# Patient Record
Sex: FEMALE | Race: WHITE | NOT HISPANIC OR LATINO | Employment: OTHER | URBAN - METROPOLITAN AREA
[De-identification: names, ages, dates, MRNs, and addresses within clinical notes are randomized per-mention and may not be internally consistent; named-entity substitution may affect disease eponyms.]

---

## 2017-09-22 ENCOUNTER — HISTORICAL (OUTPATIENT)
Dept: RADIOLOGY | Facility: HOSPITAL | Age: 62
End: 2017-09-22

## 2018-03-12 ENCOUNTER — HISTORICAL (OUTPATIENT)
Dept: ADMINISTRATIVE | Facility: HOSPITAL | Age: 63
End: 2018-03-12

## 2018-03-12 LAB
ALBUMIN SERPL-MCNC: 3.7 GM/DL (ref 3.4–5)
ALBUMIN/GLOB SERPL: 1.2 RATIO (ref 1.1–2)
ALP SERPL-CCNC: 96 UNIT/L (ref 38–126)
ALT SERPL-CCNC: 34 UNIT/L (ref 12–78)
AST SERPL-CCNC: 19 UNIT/L (ref 15–37)
BILIRUB SERPL-MCNC: 0.6 MG/DL (ref 0.2–1)
BILIRUBIN DIRECT+TOT PNL SERPL-MCNC: 0.2 MG/DL (ref 0–0.5)
BILIRUBIN DIRECT+TOT PNL SERPL-MCNC: 0.4 MG/DL (ref 0–0.8)
BUN SERPL-MCNC: 11 MG/DL (ref 7–18)
CALCIUM SERPL-MCNC: 9.2 MG/DL (ref 8.5–10.1)
CHLORIDE SERPL-SCNC: 105 MMOL/L (ref 98–107)
CO2 SERPL-SCNC: 28 MMOL/L (ref 21–32)
CREAT SERPL-MCNC: 0.88 MG/DL (ref 0.55–1.02)
GLOBULIN SER-MCNC: 3.1 GM/DL (ref 2.4–3.5)
GLUCOSE SERPL-MCNC: 110 MG/DL (ref 74–106)
MAGNESIUM SERPL-MCNC: 2.4 MG/DL (ref 1.8–2.4)
POTASSIUM SERPL-SCNC: 3.7 MMOL/L (ref 3.5–5.1)
PROT SERPL-MCNC: 6.8 GM/DL (ref 6.4–8.2)
SODIUM SERPL-SCNC: 140 MMOL/L (ref 136–145)

## 2018-07-03 ENCOUNTER — HISTORICAL (OUTPATIENT)
Dept: LAB | Facility: HOSPITAL | Age: 63
End: 2018-07-03

## 2018-07-03 LAB
BUN SERPL-MCNC: 9 MG/DL (ref 7–18)
CALCIUM SERPL-MCNC: 9.2 MG/DL (ref 8.5–10.1)
CHLORIDE SERPL-SCNC: 102 MMOL/L (ref 98–107)
CO2 SERPL-SCNC: 28.1 MMOL/L (ref 21–32)
CREAT SERPL-MCNC: 0.93 MG/DL (ref 0.55–1.02)
CREAT/UREA NIT SERPL: 10
GLUCOSE SERPL-MCNC: 153 MG/DL (ref 74–106)
POTASSIUM SERPL-SCNC: 3.4 MMOL/L (ref 3.5–5.1)
SODIUM SERPL-SCNC: 139 MMOL/L (ref 136–145)

## 2018-08-03 ENCOUNTER — HISTORICAL (OUTPATIENT)
Dept: LAB | Facility: HOSPITAL | Age: 63
End: 2018-08-03

## 2018-08-03 LAB — POTASSIUM SERPL-SCNC: 3.6 MMOL/L (ref 3.5–5.1)

## 2018-09-20 ENCOUNTER — HISTORICAL (OUTPATIENT)
Dept: LAB | Facility: HOSPITAL | Age: 63
End: 2018-09-20

## 2018-09-20 LAB
EST. AVERAGE GLUCOSE BLD GHB EST-MCNC: 117 MG/DL
HBA1C MFR BLD: 5.7 % (ref 4.5–6.2)

## 2018-11-30 ENCOUNTER — HISTORICAL (OUTPATIENT)
Dept: LAB | Facility: HOSPITAL | Age: 63
End: 2018-11-30

## 2018-11-30 LAB
BUN SERPL-MCNC: 18 MG/DL (ref 7–18)
CALCIUM SERPL-MCNC: 9.4 MG/DL (ref 8.5–10.1)
CHLORIDE SERPL-SCNC: 99 MMOL/L (ref 98–107)
CO2 SERPL-SCNC: 29.5 MMOL/L (ref 21–32)
CREAT SERPL-MCNC: 0.93 MG/DL (ref 0.55–1.02)
CREAT/UREA NIT SERPL: 19
GLUCOSE SERPL-MCNC: 89 MG/DL (ref 74–106)
MAGNESIUM SERPL-MCNC: 2.2 MG/DL (ref 1.8–2.4)
POTASSIUM SERPL-SCNC: 3.8 MMOL/L (ref 3.5–5.1)
SODIUM SERPL-SCNC: 136 MMOL/L (ref 136–145)

## 2019-01-11 ENCOUNTER — HISTORICAL (OUTPATIENT)
Dept: LAB | Facility: HOSPITAL | Age: 64
End: 2019-01-11

## 2019-01-11 LAB
ABS NEUT (OLG): 5.08
ALBUMIN SERPL-MCNC: 3.9 GM/DL (ref 3.4–5)
ALBUMIN/GLOB SERPL: 1.1 RATIO (ref 1.1–2)
ALP SERPL-CCNC: 78 UNIT/L (ref 46–116)
ALT SERPL-CCNC: 33 UNIT/L (ref 12–78)
AST SERPL-CCNC: 17 UNIT/L (ref 10–37)
BASOPHILS # BLD AUTO: 0.02 X10(3)/MCL
BASOPHILS NFR BLD AUTO: 0.3 %
BILIRUB SERPL-MCNC: 0.5 MG/DL (ref 0.2–1)
BILIRUBIN DIRECT+TOT PNL SERPL-MCNC: 0.15 MG/DL (ref 0–0.2)
BILIRUBIN DIRECT+TOT PNL SERPL-MCNC: 0.35 MG/DL
BUN SERPL-MCNC: 17 MG/DL (ref 7–18)
CALCIUM SERPL-MCNC: 9.8 MG/DL (ref 8.5–10.1)
CHLORIDE SERPL-SCNC: 102 MMOL/L (ref 98–107)
CHOLEST SERPL-MCNC: 183 MG/DL (ref 50–200)
CHOLEST/HDLC SERPL: 3 {RATIO} (ref 0–5)
CO2 SERPL-SCNC: 31.6 MMOL/L (ref 21–32)
CREAT SERPL-MCNC: 1.1 MG/DL (ref 0.55–1.02)
DIGOXIN SERPL-MCNC: 0.28 NG/ML (ref 0.9–2)
EOSINOPHIL # BLD AUTO: 0.11 X10(3)/MCL
EOSINOPHIL NFR BLD AUTO: 1.4 %
ERYTHROCYTE [DISTWIDTH] IN BLOOD BY AUTOMATED COUNT: 12 %
GLOBULIN SER-MCNC: 3.4 GM/DL (ref 2.4–3.5)
GLUCOSE SERPL-MCNC: 115 MG/DL (ref 74–106)
HCT VFR BLD AUTO: 46.5 % (ref 34–46)
HDLC SERPL-MCNC: 57 MG/DL (ref 35–60)
HGB BLD-MCNC: 15.7 GM/DL (ref 11.3–15.4)
IMM GRANULOCYTES # BLD AUTO: 0.01 10*3/UL (ref 0–0.1)
IMM GRANULOCYTES NFR BLD AUTO: 0.1 % (ref 0–1)
LDLC SERPL CALC-MCNC: 110 MG/DL (ref 50–140)
LYMPHOCYTES # BLD AUTO: 1.76 X10(3)/MCL
LYMPHOCYTES NFR BLD AUTO: 23.1 %
MCH RBC QN AUTO: 31.2 PG (ref 27–33)
MCHC RBC AUTO-ENTMCNC: 33.8 GM/DL (ref 32–35)
MCV RBC AUTO: 92.4 FL (ref 81–97)
MONOCYTES # BLD AUTO: 0.64 X10(3)/MCL
MONOCYTES NFR BLD AUTO: 8.4 %
NEUTROPHILS # BLD AUTO: 5.08 X10(3)/MCL
NEUTROPHILS NFR BLD AUTO: 66.7 %
PLATELET # BLD AUTO: 284 X10(3)/MCL (ref 151–368)
PMV BLD AUTO: 9 FL
POTASSIUM SERPL-SCNC: 4.5 MMOL/L (ref 3.5–5.1)
PROT SERPL-MCNC: 7.3 GM/DL (ref 6.4–8.2)
RBC # BLD AUTO: 5.03 X10(6)/MCL (ref 3.9–5)
SODIUM SERPL-SCNC: 140 MMOL/L (ref 136–145)
T4 SERPL-MCNC: 5.2 MCG/DL (ref 4.7–13.3)
TRIGL SERPL-MCNC: 78 MG/DL (ref 30–150)
TSH SERPL-ACNC: 0.82 MIU/ML (ref 0.35–3.75)
VLDLC SERPL CALC-MCNC: 16 MG/DL
WBC # SPEC AUTO: 7.62 X10(3)/MCL (ref 3.4–9.2)

## 2019-07-22 ENCOUNTER — HISTORICAL (OUTPATIENT)
Dept: LAB | Facility: HOSPITAL | Age: 64
End: 2019-07-22

## 2019-07-22 LAB
ALBUMIN SERPL-MCNC: 3.5 GM/DL (ref 3.4–5)
ALBUMIN/GLOB SERPL: 1 RATIO (ref 1.1–2)
ALP SERPL-CCNC: 74 UNIT/L (ref 46–116)
ALT SERPL-CCNC: 33 UNIT/L (ref 12–78)
AST SERPL-CCNC: 17 UNIT/L (ref 10–37)
BILIRUB SERPL-MCNC: 0.4 MG/DL (ref 0.2–1)
BILIRUBIN DIRECT+TOT PNL SERPL-MCNC: 0.12 MG/DL (ref 0–0.2)
BILIRUBIN DIRECT+TOT PNL SERPL-MCNC: 0.28 MG/DL
BUN SERPL-MCNC: 15 MG/DL (ref 7–18)
CALCIUM SERPL-MCNC: 9.9 MG/DL (ref 8.5–10.1)
CHLORIDE SERPL-SCNC: 103 MMOL/L (ref 98–107)
CHOLEST SERPL-MCNC: 182 MG/DL (ref 50–200)
CHOLEST/HDLC SERPL: 3 {RATIO} (ref 0–5)
CO2 SERPL-SCNC: 31.2 MMOL/L (ref 21–32)
CREAT SERPL-MCNC: 0.98 MG/DL (ref 0.55–1.02)
GLOBULIN SER-MCNC: 3.5 GM/DL (ref 2.4–3.5)
GLUCOSE SERPL-MCNC: 108 MG/DL (ref 74–106)
HDLC SERPL-MCNC: 60 MG/DL (ref 35–60)
LDLC SERPL CALC-MCNC: 107 MG/DL (ref 50–140)
POTASSIUM SERPL-SCNC: 4.1 MMOL/L (ref 3.5–5.1)
PROT SERPL-MCNC: 7 GM/DL (ref 6.4–8.2)
SODIUM SERPL-SCNC: 139 MMOL/L (ref 136–145)
TRIGL SERPL-MCNC: 73 MG/DL (ref 30–150)
VLDLC SERPL CALC-MCNC: 15 MG/DL

## 2020-01-27 ENCOUNTER — HISTORICAL (OUTPATIENT)
Dept: RADIOLOGY | Facility: HOSPITAL | Age: 65
End: 2020-01-27

## 2020-05-21 ENCOUNTER — HISTORICAL (OUTPATIENT)
Dept: LAB | Facility: HOSPITAL | Age: 65
End: 2020-05-21

## 2020-05-21 LAB
ABS NEUT (OLG): 5.28
BASOPHILS # BLD AUTO: 0.03 X10(3)/MCL
BASOPHILS NFR BLD AUTO: 0.4 %
CRP SERPL-MCNC: 0.4 MG/DL
EOSINOPHIL # BLD AUTO: 0.06 X10(3)/MCL
EOSINOPHIL NFR BLD AUTO: 0.8 %
ERYTHROCYTE [DISTWIDTH] IN BLOOD BY AUTOMATED COUNT: 13 %
ERYTHROCYTE [SEDIMENTATION RATE] IN BLOOD: 14 MM/HR (ref 0–20)
HCT VFR BLD AUTO: 45.3 % (ref 34–46)
HGB BLD-MCNC: 15 GM/DL (ref 11.3–15.4)
IMM GRANULOCYTES # BLD AUTO: 0.02 10*3/UL (ref 0–0.1)
IMM GRANULOCYTES NFR BLD AUTO: 0.3 % (ref 0–1)
LYMPHOCYTES # BLD AUTO: 1.32 X10(3)/MCL
LYMPHOCYTES NFR BLD AUTO: 18.4 %
MCH RBC QN AUTO: 31.4 PG (ref 27–33)
MCHC RBC AUTO-ENTMCNC: 33.1 GM/DL (ref 32–35)
MCV RBC AUTO: 94.8 FL (ref 81–97)
MONOCYTES # BLD AUTO: 0.47 X10(3)/MCL
MONOCYTES NFR BLD AUTO: 6.5 %
NEUTROPHILS # BLD AUTO: 5.28 X10(3)/MCL
NEUTROPHILS NFR BLD AUTO: 73.6 %
PLATELET # BLD AUTO: 310 X10(3)/MCL (ref 140–450)
PMV BLD AUTO: 9 FL
RBC # BLD AUTO: 4.78 X10(6)/MCL (ref 3.9–5)
URATE SERPL-MCNC: 5.9 MG/DL (ref 2.7–6)
WBC # SPEC AUTO: 7.18 X10(3)/MCL (ref 3.4–9.2)

## 2020-06-29 ENCOUNTER — HISTORICAL (OUTPATIENT)
Dept: LAB | Facility: HOSPITAL | Age: 65
End: 2020-06-29

## 2020-07-20 ENCOUNTER — HISTORICAL (OUTPATIENT)
Dept: LAB | Facility: HOSPITAL | Age: 65
End: 2020-07-20

## 2020-07-20 LAB
ALBUMIN SERPL-MCNC: 3.9 GM/DL (ref 3.4–4.8)
ALBUMIN/GLOB SERPL: 1.3 RATIO (ref 1.1–2)
ALP SERPL-CCNC: 66 UNIT/L (ref 40–150)
ALT SERPL-CCNC: 22 UNIT/L (ref 0–55)
AST SERPL-CCNC: 19 UNIT/L (ref 5–34)
BILIRUB SERPL-MCNC: 0.5 MG/DL
BILIRUBIN DIRECT+TOT PNL SERPL-MCNC: 0.2 MG/DL (ref 0–0.5)
BILIRUBIN DIRECT+TOT PNL SERPL-MCNC: 0.3 MG/DL
BUN SERPL-MCNC: 16 MG/DL (ref 9.8–20.1)
CALCIUM SERPL-MCNC: 10.1 MG/DL (ref 8.4–10.2)
CHLORIDE SERPL-SCNC: 105 MMOL/L (ref 98–107)
CHOLEST SERPL-MCNC: 168 MG/DL
CHOLEST/HDLC SERPL: 3 {RATIO} (ref 0–5)
CO2 SERPL-SCNC: 26 MEQ/L (ref 23–31)
CREAT SERPL-MCNC: 0.79 MG/DL (ref 0.55–1.02)
DIGOXIN SERPL-MCNC: <0.19 NG/ML (ref 0.8–2)
GLOBULIN SER-MCNC: 3 GM/DL (ref 2.4–3.5)
GLUCOSE SERPL-MCNC: 114 MG/DL (ref 82–115)
HDLC SERPL-MCNC: 57 MG/DL (ref 35–60)
LDLC SERPL CALC-MCNC: 97 MG/DL (ref 50–140)
POTASSIUM SERPL-SCNC: 3.9 MMOL/L (ref 3.5–5.1)
PROT SERPL-MCNC: 6.9 GM/DL (ref 5.8–7.6)
SODIUM SERPL-SCNC: 141 MMOL/L (ref 136–145)
TRIGL SERPL-MCNC: 72 MG/DL (ref 37–140)
VLDLC SERPL CALC-MCNC: 14 MG/DL

## 2021-09-08 ENCOUNTER — HISTORICAL (OUTPATIENT)
Dept: ADMINISTRATIVE | Facility: HOSPITAL | Age: 66
End: 2021-09-08

## 2021-09-08 LAB
APPEARANCE, UA: CLEAR
BACTERIA SPEC CULT: NORMAL /HPF
BILIRUB UR QL STRIP: NEGATIVE
COLOR UR: YELLOW
GLUCOSE (UA): NEGATIVE
HGB UR QL STRIP: NEGATIVE
KETONES UR QL STRIP: NEGATIVE
LEUKOCYTE ESTERASE UR QL STRIP: NEGATIVE
NITRITE UR QL STRIP: NEGATIVE
PH UR STRIP: 6.5 [PH] (ref 5–9)
PROT UR QL STRIP: NEGATIVE
RBC #/AREA URNS HPF: NORMAL /[HPF]
SP GR UR STRIP: 1.01 (ref 1–1.03)
SQUAMOUS EPITHELIAL, UA: NORMAL /HPF (ref 0–4)
UROBILINOGEN UR STRIP-ACNC: 0.2
WBC #/AREA URNS HPF: NORMAL /[HPF]

## 2021-09-28 LAB — CRC RECOMMENDATION EXT: NORMAL

## 2022-05-05 ENCOUNTER — LAB VISIT (OUTPATIENT)
Dept: LAB | Facility: HOSPITAL | Age: 67
End: 2022-05-05
Attending: INTERNAL MEDICINE
Payer: MEDICARE

## 2022-05-05 DIAGNOSIS — I48.0 PAROXYSMAL ATRIAL FIBRILLATION: ICD-10-CM

## 2022-05-05 DIAGNOSIS — E78.5 HYPERLIPIDEMIA, UNSPECIFIED HYPERLIPIDEMIA TYPE: ICD-10-CM

## 2022-05-05 DIAGNOSIS — I10 ESSENTIAL HYPERTENSION, MALIGNANT: Primary | ICD-10-CM

## 2022-05-05 LAB
ALBUMIN SERPL-MCNC: 4.1 GM/DL (ref 3.4–4.8)
ALBUMIN/GLOB SERPL: 1.6 RATIO (ref 1.1–2)
ALP SERPL-CCNC: 83 UNIT/L (ref 40–150)
ALT SERPL-CCNC: 30 UNIT/L (ref 0–55)
AST SERPL-CCNC: 23 UNIT/L (ref 5–34)
BILIRUBIN DIRECT+TOT PNL SERPL-MCNC: 0.2 MG/DL (ref 0–0.5)
BILIRUBIN DIRECT+TOT PNL SERPL-MCNC: 0.2 MG/DL (ref 0–0.8)
BILIRUBIN DIRECT+TOT PNL SERPL-MCNC: 0.4 MG/DL
BUN SERPL-MCNC: 20.8 MG/DL (ref 9.8–20.1)
CALCIUM SERPL-MCNC: 9.7 MG/DL (ref 8.4–10.2)
CHLORIDE SERPL-SCNC: 107 MMOL/L (ref 98–107)
CHOLEST SERPL-MCNC: 186 MG/DL
CHOLEST/HDLC SERPL: 3 {RATIO} (ref 0–5)
CO2 SERPL-SCNC: 23 MMOL/L (ref 23–31)
CREAT SERPL-MCNC: 1.63 MG/DL (ref 0.55–1.02)
DIGOXIN SERPL-MCNC: 0.6 NG/ML (ref 0.8–2)
ERYTHROCYTE [DISTWIDTH] IN BLOOD BY AUTOMATED COUNT: 16 % (ref 11.5–17)
GLOBULIN SER-MCNC: 2.6 GM/DL (ref 2.4–3.5)
GLUCOSE SERPL-MCNC: 96 MG/DL (ref 82–115)
HCT VFR BLD AUTO: 35 % (ref 37–47)
HDLC SERPL-MCNC: 57 MG/DL (ref 35–60)
HGB BLD-MCNC: 11.1 GM/DL (ref 12–16)
LDLC SERPL CALC-MCNC: 105 MG/DL (ref 50–140)
MCH RBC QN AUTO: 30.4 PG (ref 27–31)
MCHC RBC AUTO-ENTMCNC: 31.7 MG/DL (ref 33–36)
MCV RBC AUTO: 95.9 FL (ref 80–94)
NRBC BLD AUTO-RTO: 0 %
PLATELET # BLD AUTO: 326 X10(3)/MCL (ref 130–400)
PMV BLD AUTO: 9.6 FL (ref 9.4–12.4)
POTASSIUM SERPL-SCNC: 3.5 MMOL/L (ref 3.5–5.1)
PROT SERPL-MCNC: 6.7 GM/DL (ref 5.8–7.6)
RBC # BLD AUTO: 3.65 X10(6)/MCL (ref 4.2–5.4)
SODIUM SERPL-SCNC: 142 MMOL/L (ref 136–145)
T4 SERPL-MCNC: 5.65 UG/DL (ref 4.87–11.72)
TRIGL SERPL-MCNC: 122 MG/DL (ref 37–140)
TSH SERPL-ACNC: 2.57 UIU/ML (ref 0.35–4.94)
VLDLC SERPL CALC-MCNC: 24 MG/DL
WBC # SPEC AUTO: 7 X10(3)/MCL (ref 4.5–11.5)

## 2022-05-05 PROCEDURE — 84443 ASSAY THYROID STIM HORMONE: CPT

## 2022-05-05 PROCEDURE — 80061 LIPID PANEL: CPT

## 2022-05-05 PROCEDURE — 85027 COMPLETE CBC AUTOMATED: CPT

## 2022-05-05 PROCEDURE — 36415 COLL VENOUS BLD VENIPUNCTURE: CPT

## 2022-05-05 PROCEDURE — 80053 COMPREHEN METABOLIC PANEL: CPT

## 2022-05-05 PROCEDURE — 80162 ASSAY OF DIGOXIN TOTAL: CPT

## 2022-05-05 PROCEDURE — 84436 ASSAY OF TOTAL THYROXINE: CPT

## 2022-06-03 ENCOUNTER — LAB VISIT (OUTPATIENT)
Dept: LAB | Facility: HOSPITAL | Age: 67
End: 2022-06-03
Attending: INTERNAL MEDICINE
Payer: MEDICARE

## 2022-06-03 DIAGNOSIS — E21.3 HYPERPARATHYROIDISM, UNSPECIFIED: ICD-10-CM

## 2022-06-03 DIAGNOSIS — I50.9 HEART FAILURE, UNSPECIFIED HF CHRONICITY, UNSPECIFIED HEART FAILURE TYPE: ICD-10-CM

## 2022-06-03 DIAGNOSIS — N18.9 ANEMIA OF CHRONIC RENAL FAILURE, UNSPECIFIED CKD STAGE: ICD-10-CM

## 2022-06-03 DIAGNOSIS — N17.9 ACUTE KIDNEY FAILURE, UNSPECIFIED: Primary | ICD-10-CM

## 2022-06-03 DIAGNOSIS — N18.9 CHRONIC KIDNEY DISEASE, UNSPECIFIED: ICD-10-CM

## 2022-06-03 DIAGNOSIS — I12.9 PARENCHYMAL RENAL HYPERTENSION: ICD-10-CM

## 2022-06-03 DIAGNOSIS — E87.8 DILATED CARDIOMYOPATHY SECONDARY TO ELECTROLYTE DEFICIENCY: ICD-10-CM

## 2022-06-03 DIAGNOSIS — I43 DILATED CARDIOMYOPATHY SECONDARY TO ELECTROLYTE DEFICIENCY: ICD-10-CM

## 2022-06-03 DIAGNOSIS — E55.9 AVITAMINOSIS D: ICD-10-CM

## 2022-06-03 DIAGNOSIS — D63.1 ANEMIA OF CHRONIC RENAL FAILURE, UNSPECIFIED CKD STAGE: ICD-10-CM

## 2022-06-03 LAB
ALBUMIN SERPL-MCNC: 3.9 GM/DL (ref 3.4–4.8)
ALBUMIN/GLOB SERPL: 1.5 RATIO (ref 1.1–2)
ALP SERPL-CCNC: 89 UNIT/L (ref 40–150)
ALT SERPL-CCNC: 16 UNIT/L (ref 0–55)
APPEARANCE UR: CLEAR
AST SERPL-CCNC: 15 UNIT/L (ref 5–34)
BACTERIA #/AREA URNS AUTO: ABNORMAL /HPF
BILIRUB UR QL STRIP.AUTO: NEGATIVE MG/DL
BILIRUBIN DIRECT+TOT PNL SERPL-MCNC: 0.3 MG/DL
BUN SERPL-MCNC: 16.3 MG/DL (ref 9.8–20.1)
CALCIUM SERPL-MCNC: 10 MG/DL (ref 8.4–10.2)
CHLORIDE SERPL-SCNC: 103 MMOL/L (ref 98–107)
CO2 SERPL-SCNC: 26 MMOL/L (ref 23–31)
COLOR UR AUTO: YELLOW
CREAT SERPL-MCNC: 1.71 MG/DL (ref 0.55–1.02)
CREAT UR-MCNC: 232.6 MG/DL (ref 47–110)
DEPRECATED CALCIDIOL+CALCIFEROL SERPL-MC: 44.1 NG/ML (ref 30–80)
ERYTHROCYTE [DISTWIDTH] IN BLOOD BY AUTOMATED COUNT: 14 % (ref 11.5–17)
FERRITIN SERPL-MCNC: 484.17 NG/ML (ref 4.63–204)
GLOBULIN SER-MCNC: 2.6 GM/DL (ref 2.4–3.5)
GLUCOSE SERPL-MCNC: 129 MG/DL (ref 82–115)
GLUCOSE UR QL STRIP.AUTO: NEGATIVE MG/DL
HCT VFR BLD AUTO: 35.1 % (ref 37–47)
HGB BLD-MCNC: 11.4 GM/DL (ref 12–16)
IRON SATN MFR SERPL: 40 % (ref 20–50)
IRON SERPL-MCNC: 99 UG/DL (ref 50–170)
KETONES UR QL STRIP.AUTO: ABNORMAL MG/DL
LEUKOCYTE ESTERASE UR QL STRIP.AUTO: ABNORMAL UNIT/L
MCH RBC QN AUTO: 30.6 PG (ref 27–31)
MCHC RBC AUTO-ENTMCNC: 32.5 MG/DL (ref 33–36)
MCV RBC AUTO: 94.4 FL (ref 80–94)
NITRITE UR QL STRIP.AUTO: NEGATIVE
NRBC BLD AUTO-RTO: 0 %
PH UR STRIP.AUTO: 5.5 [PH]
PHOSPHATE SERPL-MCNC: 3.1 MG/DL (ref 2.3–4.7)
PLATELET # BLD AUTO: 326 X10(3)/MCL (ref 130–400)
PMV BLD AUTO: 9.6 FL (ref 9.4–12.4)
POTASSIUM SERPL-SCNC: 4 MMOL/L (ref 3.5–5.1)
PROT SERPL-MCNC: 6.5 GM/DL (ref 5.8–7.6)
PROT UR QL STRIP.AUTO: ABNORMAL MG/DL
PROT UR STRIP-MCNC: 12.4 MG/DL
PROT/CREAT UR-RTO: 53.3 MG/GM CR
PTH-INTACT SERPL-MCNC: 61.3 PG/ML (ref 8.7–77)
RBC # BLD AUTO: 3.72 X10(6)/MCL (ref 4.2–5.4)
RBC #/AREA URNS AUTO: <5 /HPF
RBC UR QL AUTO: NEGATIVE UNIT/L
SODIUM SERPL-SCNC: 140 MMOL/L (ref 136–145)
SP GR UR STRIP.AUTO: 1.02 (ref 1–1.03)
SQUAMOUS #/AREA URNS AUTO: 10 /LPF
TIBC SERPL-MCNC: 148 UG/DL (ref 70–310)
TIBC SERPL-MCNC: 247 UG/DL (ref 250–450)
TRANSFERRIN SERPL-MCNC: 211 MG/DL (ref 173–360)
URATE SERPL-MCNC: 8.5 MG/DL (ref 2.6–6)
UROBILINOGEN UR STRIP-ACNC: 0.2 MG/DL
WBC # SPEC AUTO: 7.6 X10(3)/MCL (ref 4.5–11.5)
WBC #/AREA URNS AUTO: <5 /HPF

## 2022-06-03 PROCEDURE — 84100 ASSAY OF PHOSPHORUS: CPT

## 2022-06-03 PROCEDURE — 82728 ASSAY OF FERRITIN: CPT

## 2022-06-03 PROCEDURE — 84550 ASSAY OF BLOOD/URIC ACID: CPT

## 2022-06-03 PROCEDURE — 83970 ASSAY OF PARATHORMONE: CPT

## 2022-06-03 PROCEDURE — 82306 VITAMIN D 25 HYDROXY: CPT

## 2022-06-03 PROCEDURE — 80053 COMPREHEN METABOLIC PANEL: CPT

## 2022-06-03 PROCEDURE — 82570 ASSAY OF URINE CREATININE: CPT

## 2022-06-03 PROCEDURE — 36415 COLL VENOUS BLD VENIPUNCTURE: CPT

## 2022-06-03 PROCEDURE — 81001 URINALYSIS AUTO W/SCOPE: CPT

## 2022-06-03 PROCEDURE — 85027 COMPLETE CBC AUTOMATED: CPT

## 2022-06-03 PROCEDURE — 83540 ASSAY OF IRON: CPT

## 2022-06-23 ENCOUNTER — HOSPITAL ENCOUNTER (OUTPATIENT)
Dept: RADIOLOGY | Facility: HOSPITAL | Age: 67
Discharge: HOME | End: 2022-06-23
Attending: FAMILY MEDICINE
Payer: MEDICARE

## 2022-06-23 DIAGNOSIS — R05.3 CHRONIC COUGH: Primary | ICD-10-CM

## 2022-06-23 DIAGNOSIS — R05.3 CHRONIC COUGH: ICD-10-CM

## 2022-06-23 PROCEDURE — 71046 X-RAY EXAM CHEST 2 VIEWS: CPT | Mod: TC

## 2022-06-29 ENCOUNTER — HOSPITAL ENCOUNTER (OUTPATIENT)
Dept: RADIOLOGY | Facility: HOSPITAL | Age: 67
Discharge: HOME | End: 2022-06-29
Attending: FAMILY MEDICINE
Payer: MEDICARE

## 2022-06-29 DIAGNOSIS — R06.00 DYSPNEA: ICD-10-CM

## 2022-06-29 PROCEDURE — 71250 CT THORAX DX C-: CPT | Mod: TC

## 2022-09-13 LAB — BCS RECOMMENDATION EXT: NORMAL

## 2022-09-14 ENCOUNTER — LAB VISIT (OUTPATIENT)
Dept: LAB | Facility: HOSPITAL | Age: 67
End: 2022-09-14
Attending: FAMILY MEDICINE
Payer: MEDICARE

## 2022-09-14 DIAGNOSIS — D64.9 ANEMIA, UNSPECIFIED TYPE: Primary | ICD-10-CM

## 2022-09-14 LAB
ALBUMIN SERPL-MCNC: 4.1 GM/DL (ref 3.4–4.8)
ALBUMIN/GLOB SERPL: 1.7 RATIO (ref 1.1–2)
ALP SERPL-CCNC: 75 UNIT/L (ref 40–150)
ALT SERPL-CCNC: 13 UNIT/L (ref 0–55)
AST SERPL-CCNC: 13 UNIT/L (ref 5–34)
BASOPHILS # BLD AUTO: 0.04 X10(3)/MCL (ref 0–0.2)
BASOPHILS NFR BLD AUTO: 0.7 %
BILIRUBIN DIRECT+TOT PNL SERPL-MCNC: 0.3 MG/DL
BUN SERPL-MCNC: 27.1 MG/DL (ref 9.8–20.1)
CALCIUM SERPL-MCNC: 9.9 MG/DL (ref 8.4–10.2)
CHLORIDE SERPL-SCNC: 104 MMOL/L (ref 98–107)
CO2 SERPL-SCNC: 28 MMOL/L (ref 23–31)
CREAT SERPL-MCNC: 1.84 MG/DL (ref 0.55–1.02)
EOSINOPHIL # BLD AUTO: 0.13 X10(3)/MCL (ref 0–0.9)
EOSINOPHIL NFR BLD AUTO: 2.1 %
ERYTHROCYTE [DISTWIDTH] IN BLOOD BY AUTOMATED COUNT: 12.2 % (ref 11.5–17)
GFR SERPLBLD CREATININE-BSD FMLA CKD-EPI: 30 MLS/MIN/1.73/M2
GLOBULIN SER-MCNC: 2.4 GM/DL (ref 2.4–3.5)
GLUCOSE SERPL-MCNC: 111 MG/DL (ref 82–115)
HCT VFR BLD AUTO: 36.3 % (ref 37–47)
HGB BLD-MCNC: 11.7 GM/DL (ref 12–16)
IMM GRANULOCYTES # BLD AUTO: 0.02 X10(3)/MCL (ref 0–0.04)
IMM GRANULOCYTES NFR BLD AUTO: 0.3 %
LYMPHOCYTES # BLD AUTO: 1.78 X10(3)/MCL (ref 0.6–4.6)
LYMPHOCYTES NFR BLD AUTO: 28.9 %
MCH RBC QN AUTO: 31.1 PG (ref 27–31)
MCHC RBC AUTO-ENTMCNC: 32.2 MG/DL (ref 33–36)
MCV RBC AUTO: 96.5 FL (ref 80–94)
MONOCYTES # BLD AUTO: 0.53 X10(3)/MCL (ref 0.1–1.3)
MONOCYTES NFR BLD AUTO: 8.6 %
NEUTROPHILS # BLD AUTO: 3.7 X10(3)/MCL (ref 2.1–9.2)
NEUTROPHILS NFR BLD AUTO: 59.4 %
NRBC BLD AUTO-RTO: 0 %
PLATELET # BLD AUTO: 315 X10(3)/MCL (ref 130–400)
PMV BLD AUTO: 9.3 FL (ref 7.4–10.4)
POTASSIUM SERPL-SCNC: 4.5 MMOL/L (ref 3.5–5.1)
PROT SERPL-MCNC: 6.5 GM/DL (ref 5.8–7.6)
RBC # BLD AUTO: 3.76 X10(6)/MCL (ref 4.2–5.4)
SODIUM SERPL-SCNC: 139 MMOL/L (ref 136–145)
WBC # SPEC AUTO: 6.2 X10(3)/MCL (ref 4.5–11.5)

## 2022-09-14 PROCEDURE — 85025 COMPLETE CBC W/AUTO DIFF WBC: CPT

## 2022-09-14 PROCEDURE — 80053 COMPREHEN METABOLIC PANEL: CPT

## 2022-09-14 PROCEDURE — 36415 COLL VENOUS BLD VENIPUNCTURE: CPT

## 2022-10-07 ENCOUNTER — LAB VISIT (OUTPATIENT)
Dept: LAB | Facility: HOSPITAL | Age: 67
End: 2022-10-07
Attending: INTERNAL MEDICINE
Payer: MEDICARE

## 2022-10-07 DIAGNOSIS — E78.5 HYPERLIPIDEMIA, UNSPECIFIED HYPERLIPIDEMIA TYPE: ICD-10-CM

## 2022-10-07 DIAGNOSIS — I10 ESSENTIAL HYPERTENSION, MALIGNANT: Primary | ICD-10-CM

## 2022-10-07 LAB
ALBUMIN SERPL-MCNC: 4 GM/DL (ref 3.4–4.8)
ALBUMIN/GLOB SERPL: 1.6 RATIO (ref 1.1–2)
ALP SERPL-CCNC: 80 UNIT/L (ref 40–150)
ALT SERPL-CCNC: 17 UNIT/L (ref 0–55)
AST SERPL-CCNC: 15 UNIT/L (ref 5–34)
BILIRUBIN DIRECT+TOT PNL SERPL-MCNC: 0.5 MG/DL
BUN SERPL-MCNC: 23 MG/DL (ref 9.8–20.1)
CALCIUM SERPL-MCNC: 10 MG/DL (ref 8.4–10.2)
CHLORIDE SERPL-SCNC: 105 MMOL/L (ref 98–107)
CHOLEST SERPL-MCNC: 199 MG/DL
CHOLEST/HDLC SERPL: 3 {RATIO} (ref 0–5)
CO2 SERPL-SCNC: 27 MMOL/L (ref 23–31)
CREAT SERPL-MCNC: 1.59 MG/DL (ref 0.55–1.02)
GFR SERPLBLD CREATININE-BSD FMLA CKD-EPI: 35 MLS/MIN/1.73/M2
GLOBULIN SER-MCNC: 2.5 GM/DL (ref 2.4–3.5)
GLUCOSE SERPL-MCNC: 103 MG/DL (ref 82–115)
HDLC SERPL-MCNC: 63 MG/DL (ref 35–60)
LDLC SERPL CALC-MCNC: 118 MG/DL (ref 50–140)
POTASSIUM SERPL-SCNC: 4.2 MMOL/L (ref 3.5–5.1)
PROT SERPL-MCNC: 6.5 GM/DL (ref 5.8–7.6)
SODIUM SERPL-SCNC: 141 MMOL/L (ref 136–145)
TRIGL SERPL-MCNC: 92 MG/DL (ref 37–140)
VLDLC SERPL CALC-MCNC: 18 MG/DL

## 2022-10-07 PROCEDURE — 80053 COMPREHEN METABOLIC PANEL: CPT

## 2022-10-07 PROCEDURE — 80061 LIPID PANEL: CPT

## 2022-10-07 PROCEDURE — 36415 COLL VENOUS BLD VENIPUNCTURE: CPT

## 2022-12-06 ENCOUNTER — LAB VISIT (OUTPATIENT)
Dept: LAB | Facility: HOSPITAL | Age: 67
End: 2022-12-06
Attending: INTERNAL MEDICINE
Payer: MEDICARE

## 2022-12-06 DIAGNOSIS — D50.9 IRON DEFICIENCY ANEMIA, UNSPECIFIED: ICD-10-CM

## 2022-12-06 DIAGNOSIS — E87.8 DILATED CARDIOMYOPATHY SECONDARY TO ELECTROLYTE DEFICIENCY: ICD-10-CM

## 2022-12-06 DIAGNOSIS — I43 DILATED CARDIOMYOPATHY SECONDARY TO ELECTROLYTE DEFICIENCY: ICD-10-CM

## 2022-12-06 DIAGNOSIS — I50.9 HEART FAILURE, UNSPECIFIED HF CHRONICITY, UNSPECIFIED HEART FAILURE TYPE: ICD-10-CM

## 2022-12-06 DIAGNOSIS — N18.9 ANEMIA OF CHRONIC RENAL FAILURE, UNSPECIFIED CKD STAGE: Primary | ICD-10-CM

## 2022-12-06 DIAGNOSIS — I12.9 PARENCHYMAL RENAL HYPERTENSION: ICD-10-CM

## 2022-12-06 DIAGNOSIS — E21.3 HYPERPARATHYROIDISM, UNSPECIFIED: ICD-10-CM

## 2022-12-06 DIAGNOSIS — D63.1 ANEMIA OF CHRONIC RENAL FAILURE, UNSPECIFIED CKD STAGE: Primary | ICD-10-CM

## 2022-12-06 DIAGNOSIS — E55.9 AVITAMINOSIS D: ICD-10-CM

## 2022-12-06 DIAGNOSIS — N18.32 CHRONIC KIDNEY DISEASE (CKD) STAGE G3B/A1, MODERATELY DECREASED GLOMERULAR FILTRATION RATE (GFR) BETWEEN 30-44 ML/MIN/1.73 SQUARE METER AND ALBUMINURIA CREATININE RATIO LESS THAN 30 MG/G: ICD-10-CM

## 2022-12-06 PROBLEM — I48.0 PAROXYSMAL ATRIAL FIBRILLATION: Status: ACTIVE | Noted: 2022-12-06

## 2022-12-06 PROBLEM — D68.00 VON WILLEBRAND DISEASE: Status: ACTIVE | Noted: 2022-12-06

## 2022-12-06 PROBLEM — K58.9 IRRITABLE BOWEL SYNDROME: Status: ACTIVE | Noted: 2018-11-26

## 2022-12-06 PROBLEM — I34.1 MITRAL VALVE PROLAPSE: Status: ACTIVE | Noted: 2018-11-26

## 2022-12-06 PROBLEM — E03.9 HYPOTHYROIDISM: Status: ACTIVE | Noted: 2022-12-06

## 2022-12-06 PROBLEM — I63.512 ACUTE ISCHEMIC CEREBROVASCULAR ACCIDENT (CVA) INVOLVING LEFT MIDDLE CEREBRAL ARTERY TERRITORY: Status: ACTIVE | Noted: 2022-12-06

## 2022-12-06 PROBLEM — I10 ESSENTIAL HYPERTENSION: Status: ACTIVE | Noted: 2018-11-26

## 2022-12-06 PROBLEM — K21.9 GASTROESOPHAGEAL REFLUX DISEASE WITHOUT ESOPHAGITIS: Status: ACTIVE | Noted: 2018-11-26

## 2022-12-06 PROBLEM — E89.0 POSTOPERATIVE HYPOTHYROIDISM: Status: ACTIVE | Noted: 2022-12-06

## 2022-12-06 PROBLEM — I50.22 CHRONIC SYSTOLIC CONGESTIVE HEART FAILURE: Status: ACTIVE | Noted: 2022-12-06

## 2022-12-06 LAB
ALBUMIN SERPL-MCNC: 3.9 GM/DL (ref 3.4–4.8)
ALBUMIN/GLOB SERPL: 1.7 RATIO (ref 1.1–2)
ALP SERPL-CCNC: 78 UNIT/L (ref 40–150)
ALT SERPL-CCNC: 15 UNIT/L (ref 0–55)
APPEARANCE UR: CLEAR
AST SERPL-CCNC: 15 UNIT/L (ref 5–34)
BACTERIA #/AREA URNS AUTO: NORMAL /HPF
BILIRUB UR QL STRIP.AUTO: NEGATIVE MG/DL
BILIRUBIN DIRECT+TOT PNL SERPL-MCNC: 0.4 MG/DL
BUN SERPL-MCNC: 20.3 MG/DL (ref 9.8–20.1)
CALCIUM SERPL-MCNC: 9.5 MG/DL (ref 8.4–10.2)
CHLORIDE SERPL-SCNC: 104 MMOL/L (ref 98–107)
CO2 SERPL-SCNC: 27 MMOL/L (ref 23–31)
COLOR UR AUTO: YELLOW
CREAT SERPL-MCNC: 1.39 MG/DL (ref 0.55–1.02)
ERYTHROCYTE [DISTWIDTH] IN BLOOD BY AUTOMATED COUNT: 11.9 % (ref 11.5–17)
FERRITIN SERPL-MCNC: 358.89 NG/ML (ref 4.63–204)
GFR SERPLBLD CREATININE-BSD FMLA CKD-EPI: 41 MLS/MIN/1.73/M2
GLOBULIN SER-MCNC: 2.3 GM/DL (ref 2.4–3.5)
GLUCOSE SERPL-MCNC: 119 MG/DL (ref 82–115)
GLUCOSE UR QL STRIP.AUTO: NEGATIVE MG/DL
HCT VFR BLD AUTO: 33.8 % (ref 37–47)
HGB BLD-MCNC: 11.4 GM/DL (ref 12–16)
IRON SATN MFR SERPL: 44 % (ref 20–50)
IRON SERPL-MCNC: 101 UG/DL (ref 50–170)
KETONES UR QL STRIP.AUTO: NEGATIVE MG/DL
LEUKOCYTE ESTERASE UR QL STRIP.AUTO: ABNORMAL UNIT/L
MAGNESIUM SERPL-MCNC: 2.2 MG/DL (ref 1.6–2.6)
MCH RBC QN AUTO: 32.2 PG (ref 27–31)
MCHC RBC AUTO-ENTMCNC: 33.7 MG/DL (ref 33–36)
MCV RBC AUTO: 95.5 FL (ref 80–94)
NITRITE UR QL STRIP.AUTO: NEGATIVE
NRBC BLD AUTO-RTO: 0 %
PH UR STRIP.AUTO: 6.5 [PH]
PLATELET # BLD AUTO: 285 X10(3)/MCL (ref 130–400)
PMV BLD AUTO: 9.5 FL (ref 7.4–10.4)
POTASSIUM SERPL-SCNC: 4.1 MMOL/L (ref 3.5–5.1)
PROT SERPL-MCNC: 6.2 GM/DL (ref 5.8–7.6)
PROT UR QL STRIP.AUTO: NEGATIVE MG/DL
PTH-INTACT SERPL-MCNC: 76.1 PG/ML (ref 8.7–77)
RBC # BLD AUTO: 3.54 X10(6)/MCL (ref 4.2–5.4)
RBC #/AREA URNS AUTO: <5 /HPF
RBC UR QL AUTO: NEGATIVE UNIT/L
SODIUM SERPL-SCNC: 141 MMOL/L (ref 136–145)
SP GR UR STRIP.AUTO: 1.01 (ref 1–1.03)
SQUAMOUS #/AREA URNS AUTO: <5 /HPF
TIBC SERPL-MCNC: 130 UG/DL (ref 70–310)
TIBC SERPL-MCNC: 231 UG/DL (ref 250–450)
TRANSFERRIN SERPL-MCNC: 195 MG/DL (ref 173–360)
URATE SERPL-MCNC: 6.4 MG/DL (ref 2.6–6)
UROBILINOGEN UR STRIP-ACNC: 0.2 MG/DL
WBC # SPEC AUTO: 5.5 X10(3)/MCL (ref 4.5–11.5)
WBC #/AREA URNS AUTO: <5 /HPF

## 2022-12-06 PROCEDURE — 85027 COMPLETE CBC AUTOMATED: CPT

## 2022-12-06 PROCEDURE — 82728 ASSAY OF FERRITIN: CPT

## 2022-12-06 PROCEDURE — 83970 ASSAY OF PARATHORMONE: CPT

## 2022-12-06 PROCEDURE — 36415 COLL VENOUS BLD VENIPUNCTURE: CPT

## 2022-12-06 PROCEDURE — 81001 URINALYSIS AUTO W/SCOPE: CPT

## 2022-12-06 PROCEDURE — 80053 COMPREHEN METABOLIC PANEL: CPT

## 2022-12-06 PROCEDURE — 84550 ASSAY OF BLOOD/URIC ACID: CPT

## 2022-12-06 PROCEDURE — 83735 ASSAY OF MAGNESIUM: CPT

## 2022-12-06 PROCEDURE — 83540 ASSAY OF IRON: CPT

## 2022-12-07 ENCOUNTER — OFFICE VISIT (OUTPATIENT)
Dept: FAMILY MEDICINE | Facility: CLINIC | Age: 67
End: 2022-12-07
Payer: MEDICARE

## 2022-12-07 VITALS
HEIGHT: 62 IN | DIASTOLIC BLOOD PRESSURE: 75 MMHG | OXYGEN SATURATION: 97 % | RESPIRATION RATE: 18 BRPM | TEMPERATURE: 97 F | BODY MASS INDEX: 29.26 KG/M2 | SYSTOLIC BLOOD PRESSURE: 135 MMHG | WEIGHT: 159 LBS | HEART RATE: 81 BPM

## 2022-12-07 DIAGNOSIS — Z11.59 NEED FOR HEPATITIS C SCREENING TEST: ICD-10-CM

## 2022-12-07 DIAGNOSIS — K22.70 BARRETT'S ESOPHAGUS WITHOUT DYSPLASIA: ICD-10-CM

## 2022-12-07 DIAGNOSIS — R47.01 APHASIA DUE TO ACUTE CEREBROVASCULAR ACCIDENT (CVA): ICD-10-CM

## 2022-12-07 DIAGNOSIS — R39.15 URINARY URGENCY: ICD-10-CM

## 2022-12-07 DIAGNOSIS — N18.32 STAGE 3B CHRONIC KIDNEY DISEASE: ICD-10-CM

## 2022-12-07 DIAGNOSIS — I48.0 PAROXYSMAL ATRIAL FIBRILLATION: ICD-10-CM

## 2022-12-07 DIAGNOSIS — E89.0 POSTOPERATIVE HYPOTHYROIDISM: ICD-10-CM

## 2022-12-07 DIAGNOSIS — F41.9 ANXIETY: ICD-10-CM

## 2022-12-07 DIAGNOSIS — I50.22 CHRONIC SYSTOLIC CONGESTIVE HEART FAILURE: ICD-10-CM

## 2022-12-07 DIAGNOSIS — I63.512 ACUTE ISCHEMIC CEREBROVASCULAR ACCIDENT (CVA) INVOLVING LEFT MIDDLE CEREBRAL ARTERY TERRITORY: ICD-10-CM

## 2022-12-07 DIAGNOSIS — K21.9 GASTROESOPHAGEAL REFLUX DISEASE WITHOUT ESOPHAGITIS: ICD-10-CM

## 2022-12-07 DIAGNOSIS — I63.9 APHASIA DUE TO ACUTE CEREBROVASCULAR ACCIDENT (CVA): ICD-10-CM

## 2022-12-07 DIAGNOSIS — I10 ESSENTIAL HYPERTENSION: ICD-10-CM

## 2022-12-07 DIAGNOSIS — Z28.21 INFLUENZA VACCINATION DECLINED BY PATIENT: ICD-10-CM

## 2022-12-07 DIAGNOSIS — R73.9 HYPERGLYCEMIA: ICD-10-CM

## 2022-12-07 DIAGNOSIS — Z76.89 ESTABLISHING CARE WITH NEW DOCTOR, ENCOUNTER FOR: Primary | ICD-10-CM

## 2022-12-07 DIAGNOSIS — Z28.21 PNEUMOCOCCAL VACCINATION DECLINED BY PATIENT: ICD-10-CM

## 2022-12-07 DIAGNOSIS — Z00.00 MEDICARE ANNUAL WELLNESS VISIT, INITIAL: ICD-10-CM

## 2022-12-07 PROCEDURE — 99205 PR OFFICE/OUTPT VISIT, NEW, LEVL V, 60-74 MIN: ICD-10-PCS | Mod: ,,, | Performed by: FAMILY MEDICINE

## 2022-12-07 PROCEDURE — 99205 OFFICE O/P NEW HI 60 MIN: CPT | Mod: ,,, | Performed by: FAMILY MEDICINE

## 2022-12-07 RX ORDER — METOPROLOL SUCCINATE 50 MG/1
25 TABLET, EXTENDED RELEASE ORAL DAILY
COMMUNITY

## 2022-12-07 RX ORDER — SPIRONOLACTONE 25 MG/1
25 TABLET ORAL
COMMUNITY
Start: 2022-09-22

## 2022-12-07 RX ORDER — PANTOPRAZOLE SODIUM 40 MG/1
40 TABLET, DELAYED RELEASE ORAL 2 TIMES DAILY
COMMUNITY

## 2022-12-07 RX ORDER — DIGOXIN 0.06 MG/1
1 TABLET ORAL DAILY
COMMUNITY
End: 2022-12-21

## 2022-12-07 RX ORDER — METOPROLOL SUCCINATE 25 MG/1
25 TABLET, EXTENDED RELEASE ORAL DAILY
COMMUNITY
Start: 2022-11-16 | End: 2022-12-21

## 2022-12-07 RX ORDER — THYROID, PORCINE 15 MG/1
15 TABLET ORAL DAILY
COMMUNITY
Start: 2022-11-07

## 2022-12-07 RX ORDER — BUSPIRONE HYDROCHLORIDE 7.5 MG/1
7.5 TABLET ORAL DAILY
COMMUNITY
Start: 2022-11-27 | End: 2023-03-08

## 2022-12-07 RX ORDER — AMLODIPINE BESYLATE 5 MG/1
5 TABLET ORAL DAILY
COMMUNITY
Start: 2022-11-02 | End: 2024-03-27

## 2022-12-07 RX ORDER — MONTELUKAST SODIUM 10 MG/1
10 TABLET ORAL DAILY
COMMUNITY
Start: 2022-10-28

## 2022-12-07 RX ORDER — SUCRALFATE 1 G/1
1 TABLET ORAL 3 TIMES DAILY
COMMUNITY
End: 2023-03-08

## 2022-12-07 RX ORDER — DIGOXIN 0.06 MG/1
62.5 TABLET ORAL DAILY
COMMUNITY
End: 2022-12-07

## 2022-12-07 NOTE — PROGRESS NOTES
Alondra Vigil  12/07/2022  [id number]    KRYSTAL GARCIA MD  Patient Care Team:  Krystal Garcia MD as PCP - General (Family Medicine)      Chief Complaint:  Chief Complaint   Patient presents with    [facility]     Establish Care       History of Present Illness:  HPI    67 y.o. female who presents today to [facility]. Of note, patient was hospitalized with covid in Feb 2022 for two months and transferred to an LTAC until April 2022. During that time, she had a stroke, Covid cardiomyopathy, pneumothorax, PEG tube, hemodialysis, tracheostomy. Labs from Dr. Majano were discussed with patient.     She has a PMH of Von Willebrand Disease, CHF, A-fib, HTN, postsurgical hypothyroidism.     She c/o urinary urgency and incontinence during the night. This does not happen during the day. She does drink gatorade or water close to bedtime.     I spent a total of 60 minutes on the day of the visit.This includes face to face time and non-face to face time preparing to see the patient (eg, review of tests), obtaining and/or reviewing separately obtained history, documenting clinical information in the electronic or other health record, independently interpreting results and communicating results to the patient/family/caregiver, or care coordinator. I spent 45 minutes minimum just reviewing notes, labs, histories in [facility]       Review of Systems  General: denies f/c, weight loss, night sweats, decreased appetite  Eye: denies blurred vision, changes in vision  Respiratory: denies sob, wheezing, cough  Cardiovascular: denies chest pain, palpitations, edema  Gastrointestinal: denies abdominal pain, n/v, constipation, diarrhea  Integumentary: denies rashes, pruritis        Health Maintenance  Health Maintenance Topics with due status: Not Due       Topic Last Completion Date    Lipid Panel 10/07/2022     Health Maintenance Due   Topic Date Due    Hepatitis C Screening  Never done    TETANUS VACCINE   Never done    Mammogram  Never done    Shingles Vaccine (1 of 2) Never done    High Dose Statin  Never done    DEXA Scan  Never done    Colorectal Cancer Screening  Never done       Exam:  Vitals:    12/07/22 0934   BP: 135/75   Pulse: 81   Resp: 18   Temp: 96.9 °F (36.1 °C)     Weight: 72.1 kg (159 lb)   Body mass index is 29.08 kg/m².      Physical Exam  Constitutional: NAD, alert, pleasant  Respiratory: CTAB, no wheezes, rales or rhonchi. No accessory muscle use  Eyes: EOMI  Cardiovascular: RRR, No m/r/g. No JVD. No LE edema  Gastrointestinal: BS+, nontender, nondistended  Integumentary: warm, dry, intact  Psych: AA&Ox3      ICD-10-CM ICD-9-CM   1. Establishing care with new doctor, encounter for  Z76.89 V65.8   2. Postoperative hypothyroidism  E89.0 244.0   3. Anxiety  F41.9 300.00   4. Stage 3b chronic kidney disease  N18.32 585.3   5. Hyperglycemia  R73.9 790.29   6. Gastroesophageal reflux disease without esophagitis  K21.9 530.81   7. Essential hypertension  I10 401.9   8. 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 Influenza vaccination declined by patient  Z28.21 V64.06   10. Paroxysmal atrial fibrillation  I48.0 427.31   11. Mayen's esophagus without dysplasia  K22.70 530.85   12. Pneumococcal vaccination declined by patient  Z28.21 V64.06   13. Urinary urgency  R39.15 788.63   14. Chronic systolic congestive heart failure  I50.22 428.22     428.0   15. Acute ischemic cerebrovascular accident (CVA) involving left middle cerebral artery territory  I63.512 434.91   16. Medicare annual wellness visit, initial  Z00.00 V70.0   17. Need for hepatitis C screening test  Z11.59 V73.89       1. Establishing care with new doctor, encounter for    2. Postoperative hypothyroidism  Overview:  S/p partial thyroidectomy for benign nodule. Sees Dr. Negrete in [place].     Assessment & Plan:  tsh at goal on armour thyroid  Will repeat in 3 mts    Orders:  -     TSH; Future;  Expected date: 03/07/2023    3. Anxiety  Overview:  On buspirone 7.5 mg bid, but states her anxiety is well controlled and will try to decrease to once a day then will try to wean off    Assessment & Plan:  May wean off as instructed      4. Stage 3b chronic kidney disease  Overview:  Sees nephrology. No longer on dialysis. CKD is stable.     Assessment & Plan:  Continue current meds, continue avoiding nsaids  Keep f/u with Dr. Majano    Orders:  -     CBC Auto Differential; Future; Expected date: 03/07/2023  -     Basic Metabolic Panel; Future; Expected date: 03/07/2023    5. Hyperglycemia  Overview:  No recent a1c    Orders:  -     Hemoglobin A1C; Future; Expected date: 03/07/2023    6. Gastroesophageal reflux disease without esophagitis  Overview:  On protonix and carafate      7. Essential hypertension  Overview:  At goal on current meds. Sees Dr. Richards      8. 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       9. Influenza vaccination declined by patient    10. Paroxysmal atrial fibrillation  Overview:  On beta blockers and digoxin. Sees Dr. Richards.     Assessment & Plan:  Continue current Rx meds      11. Mayen's esophagus without dysplasia  Overview:  Sees Dr. Brooks. ON protonix and carafate    Assessment & Plan:  Request notes from Dr. Brooks      12. Pneumococcal vaccination declined by patient    13. Urinary urgency  Overview:  Only happens at night. She does drink a large glass of water or gatorade at bedtime. Also has incontinence during the ngiht but not during the day    Assessment & Plan:  Discussed kegel exercises  Stop drinking liquids after 6:30 pm. Only take a sip of water with med at bedtime  Empty bladder before bedtime  If no improvement, will consider medication      14. Chronic systolic congestive heart failure  Overview:  Stable on current meds. Sees. Dr. Richards.    Assessment & Plan:  *continue current Rx meds and keep f/u with   Susie      15. Acute ischemic cerebrovascular accident (CVA) involving left middle cerebral artery territory  Overview:  Occurred while inpatient with covid. She does see neurology    Assessment & Plan:  No longer taking plavix. Does not see neurology. Does suffer from global aphasia.       16. Medicare annual wellness visit, initial  -     Lipid Panel; Future; Expected date: 03/07/2023  -     TSH; Future; Expected date: 03/07/2023  -     Hemoglobin A1C; Future; Expected date: 03/07/2023    17. Need for hepatitis C screening test  -     Hepatitis C Antibody; Future; Expected date: 03/07/2023     Patient was counseled on risks/benefits of receiving immunization. All questions were answered      Follow up: Follow up for medicare wellness with labs.      Care Plan/Goals: Reviewed   Goals    None

## 2022-12-07 NOTE — ASSESSMENT & PLAN NOTE
Discussed kegel exercises  Stop drinking liquids after 6:30 pm. Only take a sip of water with med at bedtime  Empty bladder before bedtime  If no improvement, will consider medication

## 2022-12-14 ENCOUNTER — DOCUMENTATION ONLY (OUTPATIENT)
Dept: FAMILY MEDICINE | Facility: CLINIC | Age: 67
End: 2022-12-14
Payer: MEDICARE

## 2022-12-21 ENCOUNTER — DOCUMENTATION ONLY (OUTPATIENT)
Dept: FAMILY MEDICINE | Facility: CLINIC | Age: 67
End: 2022-12-21

## 2022-12-21 ENCOUNTER — OFFICE VISIT (OUTPATIENT)
Dept: FAMILY MEDICINE | Facility: CLINIC | Age: 67
End: 2022-12-21
Payer: MEDICARE

## 2022-12-21 VITALS
TEMPERATURE: 98 F | HEART RATE: 76 BPM | DIASTOLIC BLOOD PRESSURE: 70 MMHG | OXYGEN SATURATION: 96 % | WEIGHT: 161 LBS | RESPIRATION RATE: 16 BRPM | SYSTOLIC BLOOD PRESSURE: 125 MMHG | HEIGHT: 62 IN | BODY MASS INDEX: 29.63 KG/M2

## 2022-12-21 DIAGNOSIS — K29.40: ICD-10-CM

## 2022-12-21 DIAGNOSIS — R93.89 ABNORMAL CT OF THE CHEST: ICD-10-CM

## 2022-12-21 DIAGNOSIS — R05.1 ACUTE COUGH: Primary | ICD-10-CM

## 2022-12-21 LAB
CTP QC/QA: YES
FLUAV AG NPH QL: NEGATIVE
FLUBV AG NPH QL: NEGATIVE
SARS-COV-2 RNA RESP QL NAA+PROBE: NOT DETECTED

## 2022-12-21 PROCEDURE — 87804 POCT INFLUENZA A/B: ICD-10-PCS | Mod: 59,QW,, | Performed by: FAMILY MEDICINE

## 2022-12-21 PROCEDURE — 99214 PR OFFICE/OUTPT VISIT, EST, LEVL IV, 30-39 MIN: ICD-10-PCS | Mod: ,,, | Performed by: FAMILY MEDICINE

## 2022-12-21 PROCEDURE — 99214 OFFICE O/P EST MOD 30 MIN: CPT | Mod: ,,, | Performed by: FAMILY MEDICINE

## 2022-12-21 PROCEDURE — 87804 INFLUENZA ASSAY W/OPTIC: CPT | Mod: QW,,, | Performed by: FAMILY MEDICINE

## 2022-12-21 RX ORDER — PROMETHAZINE HYDROCHLORIDE AND DEXTROMETHORPHAN HYDROBROMIDE 6.25; 15 MG/5ML; MG/5ML
5 SYRUP ORAL EVERY 6 HOURS
Qty: 240 ML | Refills: 0 | Status: SHIPPED | OUTPATIENT
Start: 2022-12-21 | End: 2022-12-31

## 2022-12-21 RX ORDER — DEXBROMPHENIRAMINE MALEATE, PHENYLEPHRINE HYDROCHLORIDE 2; 7.5 MG/1; MG/1
1 TABLET ORAL EVERY 4 HOURS PRN
Qty: 12 TABLET | Refills: 0 | Status: SHIPPED | OUTPATIENT
Start: 2022-12-21 | End: 2022-12-26

## 2022-12-21 NOTE — PROGRESS NOTES
Alondra Vigil  2022  [id number]    KRYSTAL GARCIA MD  Patient Care Team:  Krystal Garcia MD as PCP - General (Family Medicine)      Chief Complaint:  Chief Complaint   Patient presents with    Cough     3 months        History of Present Illness:  HPI    67 y.o. female who presents today c/o acute cough x2 days in addition to chronic cough x 3 mts. She was hospitalized with covid in 2022 and almost  from it. CT chest in  showed the following:    Impression:     1. Bilateral lungs resolved patchy and ground opacities related to COVID-19     2. Previous exam was remarkable bilateral lower lung lobes airspace opacities with central cavitation which show partial improvement.  Continued follow-up exams are recommended.     3. Scattered pulmonary fibrotic changes which are more pronounced within the right upper lung lobe.        Electronically signed by: Valentin Trejo  Date:                                            2022  Time:                                           12:43      However, the past two days, she has had a new and worsening dry cough that is constant with rhinorrhea and postnasal drip. She is taking ala-hist PE and asking for a refill. Denies f/c, body aches, headache, sob, wheezing, sick contacts, n/v/d, decreased appetite.     Review of Systems  General: denies f/c, weight loss, night sweats, decreased appetite  Eye: denies blurred vision, changes in vision  Respiratory: denies sob, wheezing  Cardiovascular: denies chest pain, palpitations, edema  Gastrointestinal: denies abdominal pain, n/v, constipation, diarrhea  Integumentary: denies rashes, pruritis        Health Maintenance  Health Maintenance Topics with due status: Not Due       Topic Last Completion Date    Mammogram 2022    Lipid Panel 10/07/2022     Health Maintenance Due   Topic Date Due    Hepatitis C Screening  Never done    TETANUS VACCINE  Never done    Shingles Vaccine (1 of 2) Never done     High Dose Statin  Never done    DEXA Scan  Never done    Colorectal Cancer Screening  Never done       Exam:  Vitals:    12/21/22 0854   BP: 125/70   Pulse: 76   Resp: 16   Temp: 97.9 °F (36.6 °C)     Weight: 73 kg (161 lb)   Body mass index is 29.45 kg/m².      Physical Exam  Constitutional: NAD, alert, pleasant  Respiratory: CTAB, no wheezes, rales or rhonchi. No accessory muscle use. + dry cough  Eyes: EOMI  Cardiovascular: RRR, No m/r/g. No JVD. No LE edema  Integumentary: warm, dry, intact  Psych: AA&Ox3      ICD-10-CM ICD-9-CM   1. Acute cough  R05.1 786.2   2. Abnormal CT of the chest  R93.89 793.2       1. Acute cough  Comments:  flu neg. covid test ordered. will send in alahist and promethazine dm. i did let patient know that ala-hist can cause htn, palpitations, chest pain, etc.  Orders:  -     COVID-19 Routine Screening  -     dexbrompheniramine-phenyleph (ALAHIST PE) 2-7.5 mg Tab; Take 1 tablet by mouth every 4 (four) hours as needed (allergies). Do not exceed 6 tabs in 24 hrs  Dispense: 12 tablet; Refill: 0  -     promethazine-dextromethorphan (PROMETHAZINE-DM) 6.25-15 mg/5 mL Syrp; Take 5 mLs by mouth every 6 (six) hours. Prn cough for 10 days  Dispense: 240 mL; Refill: 0    2. Abnormal CT of the chest  Comments:  ct chest ordered for f/u per radiologist recommendation  Orders:  -     CT Chest Without Contrast; Future; Expected date: 12/21/2022         Follow up: No follow-ups on file.      Care Plan/Goals: Reviewed   Goals    None

## 2022-12-28 LAB — BMD RECOMMENDATION EXT: NORMAL

## 2023-01-03 ENCOUNTER — HOSPITAL ENCOUNTER (OUTPATIENT)
Dept: RADIOLOGY | Facility: HOSPITAL | Age: 68
Discharge: HOME | End: 2023-01-03
Attending: FAMILY MEDICINE
Payer: MEDICARE

## 2023-01-03 DIAGNOSIS — R93.89 ABNORMAL CT OF THE CHEST: ICD-10-CM

## 2023-01-03 PROCEDURE — 71250 CT THORAX DX C-: CPT | Mod: TC

## 2023-01-04 DIAGNOSIS — J84.10 PULMONARY FIBROSIS DETERMINED BY HIGH RESOLUTION COMPUTED TOMOGRAPHY: Primary | ICD-10-CM

## 2023-01-04 NOTE — PROGRESS NOTES
CT chest shows that she has chronic mild lung fibrosis which is hardening of the lungs. This is likely due to covid but I cannot be certain. This is also most likely the cause of her chronic cough. I would recommend a referral to pulmonology for the fibrosis of the lungs. Let me know if she agrees to this.     How is her cough now?

## 2023-01-04 NOTE — PROGRESS NOTES
I called and gave the patient her results. Patient asked if we could send the referral to Dr. Caden Azevedo.

## 2023-01-04 NOTE — PROGRESS NOTES
I spoke with the patient and let her know about the PFT. She voiced understanding and said she will let us know if you don't hear from them within a week.

## 2023-01-05 ENCOUNTER — CLINICAL SUPPORT (OUTPATIENT)
Dept: FAMILY MEDICINE | Facility: CLINIC | Age: 68
End: 2023-01-05
Payer: MEDICARE

## 2023-01-05 ENCOUNTER — TELEPHONE (OUTPATIENT)
Dept: FAMILY MEDICINE | Facility: CLINIC | Age: 68
End: 2023-01-05

## 2023-01-05 ENCOUNTER — DOCUMENTATION ONLY (OUTPATIENT)
Dept: FAMILY MEDICINE | Facility: CLINIC | Age: 68
End: 2023-01-05

## 2023-01-05 DIAGNOSIS — Z91.89 PNEUMOCOCCAL VACCINATION INDICATED: Primary | ICD-10-CM

## 2023-01-05 PROCEDURE — G0009 PNEUMOCOCCAL CONJUGATE VACCINE 20-VALENT: ICD-10-PCS | Mod: ,,, | Performed by: FAMILY MEDICINE

## 2023-01-05 PROCEDURE — G0009 ADMIN PNEUMOCOCCAL VACCINE: HCPCS | Mod: ,,, | Performed by: FAMILY MEDICINE

## 2023-01-05 PROCEDURE — 90677 PNEUMOCOCCAL CONJUGATE VACCINE 20-VALENT: ICD-10-PCS | Mod: ,,, | Performed by: FAMILY MEDICINE

## 2023-01-05 PROCEDURE — 90677 PCV20 VACCINE IM: CPT | Mod: ,,, | Performed by: FAMILY MEDICINE

## 2023-01-05 NOTE — PROGRESS NOTES
Gave patient an injection of Prevnar 20 in right deltoid. Patient tolerated the injection well.

## 2023-01-05 NOTE — TELEPHONE ENCOUNTER
Terry,  We requested a colonoscopy from Dr. Brooks on 12/7 but all they sent was an EGD. Can you please follow up on this? Check to see if she even had a colonoscopy. Thanks

## 2023-01-19 ENCOUNTER — PROCEDURE VISIT (OUTPATIENT)
Dept: RESPIRATORY THERAPY | Facility: HOSPITAL | Age: 68
End: 2023-01-19
Attending: FAMILY MEDICINE
Payer: MEDICARE

## 2023-01-19 DIAGNOSIS — J84.10 PULMONARY FIBROSIS DETERMINED BY HIGH RESOLUTION COMPUTED TOMOGRAPHY: ICD-10-CM

## 2023-01-19 PROCEDURE — 94727 GAS DIL/WSHOT DETER LNG VOL: CPT

## 2023-01-19 PROCEDURE — 94010 BREATHING CAPACITY TEST: CPT

## 2023-01-19 PROCEDURE — 94729 DIFFUSING CAPACITY: CPT

## 2023-02-23 ENCOUNTER — TELEPHONE (OUTPATIENT)
Dept: FAMILY MEDICINE | Facility: CLINIC | Age: 68
End: 2023-02-23
Payer: MEDICARE

## 2023-02-23 NOTE — TELEPHONE ENCOUNTER
I called Dr. Cook's office to see if they could fax the results over. I was sent to a voice mail. I left a voice mail to please fax the patient's result's over. I left our fax number and our phone number.

## 2023-02-23 NOTE — TELEPHONE ENCOUNTER
I tried to call the patient back but only got her voice mail. I left a message to call us back with our number. I called Dr. Cook's office to spoke with Chasity. She said the referral didn't go through. I resent the referral through epic.

## 2023-02-23 NOTE — TELEPHONE ENCOUNTER
----- Message from [facility] sent at 2/23/2023  1:28 PM CST -----  Regarding: test results  .Type:  Test Results    Who Called:  pt    Name of Test (Lab/Mammo/Etc):  breathing test    Date of Test: 3 to 4 weeks ago    Ordering Provider:  Echo    Where the test was performed: [facility]    Would the patient rather a call back? Yes    Best Call Back Number: [phone]    Additional Information:   pt wants the results of her breathing test that she took 3 to 4 weeks ago

## 2023-02-23 NOTE — TELEPHONE ENCOUNTER
----- Message from [facility] sent at 2/23/2023  1:28 PM CST -----  Regarding: test results  .Type:  Test Results    Who Called:  pt    Name of Test (Lab/Mammo/Etc):  breathing test    Date of Test: 3 to 4 weeks ago    Ordering Provider:  Echo    Where the test was performed: [facility]    Would the patient rather a call back? Yes    Best Call Back Number: [phone]    Additional Information:   pt wants the results of her breathing test that she took 3 to 4 weeks ago

## 2023-02-23 NOTE — TELEPHONE ENCOUNTER
----- Message from [facility] sent at 2/23/2023  1:28 PM CST -----  Regarding: test results  .Type:  Test Results    Who Called:  pt    Name of Test (Lab/Mammo/Etc):  breathing test    Date of Test: 3 to 4 weeks ago    Ordering Provider:  Echo    Where the test was performed: [facility]    Would the patient rather a call back? Yes    Best Call Back Number: [phone]    Additional Information:   pt wants the results of her breathing test that she took 3 to 4 weeks ago

## 2023-02-23 NOTE — TELEPHONE ENCOUNTER
I spoke to the patient and let her know that we didn't get the results but that I requested them from Dr. Cook's office. She voiced understanding. I informed her that Dr. Dodge is out of the office this week but if we get the result's in she will review them next week. The patient voiced understanding.

## 2023-02-24 NOTE — TELEPHONE ENCOUNTER
I called the patient and gave her the information and informed her of the referral. She voiced understanding.

## 2023-03-07 RX ORDER — ZINC SULFATE 50(220)MG
1 CAPSULE ORAL EVERY MORNING
COMMUNITY
Start: 2022-04-06

## 2023-03-07 RX ORDER — ASCORBIC ACID 500 MG
1000 TABLET ORAL
COMMUNITY
Start: 2022-04-06 | End: 2023-04-06

## 2023-03-07 RX ORDER — FUROSEMIDE 20 MG/1
TABLET ORAL
COMMUNITY
End: 2023-03-08

## 2023-03-07 RX ORDER — ATORVASTATIN CALCIUM 20 MG/1
TABLET, FILM COATED ORAL
COMMUNITY

## 2023-03-07 RX ORDER — AZITHROMYCIN 250 MG/1
TABLET, FILM COATED ORAL
COMMUNITY
End: 2023-03-08

## 2023-03-07 RX ORDER — CLOPIDOGREL BISULFATE 75 MG/1
75 TABLET ORAL
COMMUNITY
Start: 2022-04-06 | End: 2023-03-08

## 2023-03-07 RX ORDER — BUDESONIDE 0.5 MG/2ML
0.5 INHALANT ORAL
COMMUNITY
Start: 2022-04-06

## 2023-03-07 RX ORDER — PROMETHAZINE HYDROCHLORIDE AND CODEINE PHOSPHATE 6.25; 1 MG/5ML; MG/5ML
SOLUTION ORAL
COMMUNITY
End: 2024-03-27 | Stop reason: SDUPTHER

## 2023-03-07 RX ORDER — ALBUTEROL SULFATE 90 UG/1
AEROSOL, METERED RESPIRATORY (INHALATION)
COMMUNITY

## 2023-03-07 RX ORDER — PROMETHAZINE HYDROCHLORIDE 25 MG/1
TABLET ORAL
COMMUNITY
End: 2023-04-06

## 2023-03-07 RX ORDER — HYDRALAZINE HYDROCHLORIDE 10 MG/1
TABLET, FILM COATED ORAL
COMMUNITY
Start: 2022-04-06 | End: 2023-03-08

## 2023-03-07 RX ORDER — IPRATROPIUM BROMIDE AND ALBUTEROL SULFATE 2.5; .5 MG/3ML; MG/3ML
3 SOLUTION RESPIRATORY (INHALATION)
COMMUNITY
Start: 2022-04-06 | End: 2023-04-06 | Stop reason: SDUPTHER

## 2023-03-07 RX ORDER — DEXBROMPHENIRAMINE MALEATE AND PHENYLEPHRINE HYDROCHLORIDE 2; 10 MG/1; MG/1
TABLET ORAL
COMMUNITY
End: 2023-03-08

## 2023-03-07 RX ORDER — IBUPROFEN 100 MG/5ML
1 SUSPENSION, ORAL (FINAL DOSE FORM) ORAL EVERY MORNING
COMMUNITY

## 2023-03-08 ENCOUNTER — DOCUMENTATION ONLY (OUTPATIENT)
Dept: ADMINISTRATIVE | Facility: HOSPITAL | Age: 68
End: 2023-03-08
Payer: MEDICARE

## 2023-03-08 ENCOUNTER — PATIENT OUTREACH (OUTPATIENT)
Dept: ADMINISTRATIVE | Facility: HOSPITAL | Age: 68
End: 2023-03-08
Payer: MEDICARE

## 2023-03-08 ENCOUNTER — TELEPHONE (OUTPATIENT)
Dept: FAMILY MEDICINE | Facility: CLINIC | Age: 68
End: 2023-03-08

## 2023-03-08 ENCOUNTER — OFFICE VISIT (OUTPATIENT)
Dept: FAMILY MEDICINE | Facility: CLINIC | Age: 68
End: 2023-03-08
Payer: MEDICARE

## 2023-03-08 VITALS
WEIGHT: 161.63 LBS | DIASTOLIC BLOOD PRESSURE: 65 MMHG | HEART RATE: 89 BPM | OXYGEN SATURATION: 96 % | TEMPERATURE: 98 F | RESPIRATION RATE: 16 BRPM | HEIGHT: 62 IN | SYSTOLIC BLOOD PRESSURE: 125 MMHG | BODY MASS INDEX: 29.74 KG/M2

## 2023-03-08 DIAGNOSIS — M79.89 SWELLING OF FINGER, RIGHT: ICD-10-CM

## 2023-03-08 DIAGNOSIS — Z86.010 PERSONAL HISTORY OF COLONIC POLYPS: ICD-10-CM

## 2023-03-08 DIAGNOSIS — D68.00 VON WILLEBRAND DISEASE: ICD-10-CM

## 2023-03-08 DIAGNOSIS — E89.0 POSTOPERATIVE HYPOTHYROIDISM: ICD-10-CM

## 2023-03-08 DIAGNOSIS — K22.70 BARRETT'S ESOPHAGUS WITHOUT DYSPLASIA: ICD-10-CM

## 2023-03-08 DIAGNOSIS — N18.32 STAGE 3B CHRONIC KIDNEY DISEASE: ICD-10-CM

## 2023-03-08 DIAGNOSIS — I48.0 PAROXYSMAL ATRIAL FIBRILLATION: ICD-10-CM

## 2023-03-08 DIAGNOSIS — Z00.00 MEDICARE ANNUAL WELLNESS VISIT, INITIAL: Primary | ICD-10-CM

## 2023-03-08 DIAGNOSIS — I50.22 CHRONIC SYSTOLIC CONGESTIVE HEART FAILURE: ICD-10-CM

## 2023-03-08 DIAGNOSIS — I10 ESSENTIAL HYPERTENSION: ICD-10-CM

## 2023-03-08 DIAGNOSIS — I69.30 HISTORY OF CEREBROVASCULAR ACCIDENT (CVA) WITH RESIDUAL DEFICIT: ICD-10-CM

## 2023-03-08 PROBLEM — F41.9 ANXIETY: Status: RESOLVED | Noted: 2022-12-07 | Resolved: 2023-03-08

## 2023-03-08 PROBLEM — Z86.0100 PERSONAL HISTORY OF COLONIC POLYPS: Status: ACTIVE | Noted: 2023-03-08

## 2023-03-08 PROBLEM — I63.512 ACUTE ISCHEMIC CEREBROVASCULAR ACCIDENT (CVA) INVOLVING LEFT MIDDLE CEREBRAL ARTERY TERRITORY: Status: RESOLVED | Noted: 2022-12-06 | Resolved: 2023-03-08

## 2023-03-08 PROCEDURE — G0438 PR WELCOME MEDICARE ANNUAL WELLNESS INITIAL VISIT: ICD-10-PCS | Mod: ,,, | Performed by: FAMILY MEDICINE

## 2023-03-08 PROCEDURE — 99214 PR OFFICE/OUTPT VISIT, EST, LEVL IV, 30-39 MIN: ICD-10-PCS | Mod: 25,,, | Performed by: FAMILY MEDICINE

## 2023-03-08 PROCEDURE — 99214 OFFICE O/P EST MOD 30 MIN: CPT | Mod: 25,,, | Performed by: FAMILY MEDICINE

## 2023-03-08 PROCEDURE — G0438 PPPS, INITIAL VISIT: HCPCS | Mod: ,,, | Performed by: FAMILY MEDICINE

## 2023-03-08 RX ORDER — MUPIROCIN 20 MG/G
OINTMENT TOPICAL 3 TIMES DAILY
Qty: 22 G | Refills: 0 | Status: SHIPPED | OUTPATIENT
Start: 2023-03-08 | End: 2023-04-06

## 2023-03-08 RX ORDER — MUPIROCIN 20 MG/G
OINTMENT TOPICAL
Status: SHIPPED | OUTPATIENT
Start: 2023-03-08

## 2023-03-08 NOTE — ASSESSMENT & PLAN NOTE
Continue current rx meds and gerd diet  Stick to a bland diet. Avoid spicy foods, red sauces, sodas, chocolate, pepper, extra seasoning on food, hot chips.   Eat things like rice, bread, soup, baked meats, water.  Take meds as directed.  Avoid nsaids and aspirin unless medically necessary. If necessary, take with food for the shortest duration.

## 2023-03-08 NOTE — ASSESSMENT & PLAN NOTE
Labs ordered  Will continue to monitor renal indices  Avoid NSAIDS (aleve, motrin, naproxen, mobic, ibuprofen). It is ok to take in small doses on occasion, but check with your doctor first  Increase water intake to 80 oz of water daily  Adhere to a low protein diet

## 2023-03-08 NOTE — PROGRESS NOTES
Patient ID: [id number]     Chief Complaint: Annual Exam (Wellness exam)      HPI:     Alondra Vigil is a 67 y.o. female here today for a Medicare Wellness. Labs were not done. She is a former smoker. She quit smoking 20 years ago, smoked less than 1 ppd.       Opioid Screening: Patient medication list reviewed, patient is not taking prescription opioids. Patient is not using additional opioids than prescribed. Patient is not at low risk of substance abuse based on this opioid use history.     As a separate em visit, patient c/o swelling to right cuticle of middle finger with some dip joint swelling. Mild tenderness. No drainage or decreased rom    ----------------------------  Acute ischemic cerebrovascular accident (CVA) involving left middle   cerebral artery territory  Anxiety disorder, unspecified  Chronic systolic CHF (congestive heart failure)  CKD (chronic kidney disease) stage 3, GFR 30-59 ml/min  Essential (primary) hypertension  Paroxysmal atrial fibrillation  Postsurgical hypothyroidism  Von Willebrand disease     Past Surgical History:   Procedure Laterality Date    APPENDECTOMY      CHOLECYSTECTOMY      GASTROSTOMY TUBE PLACEMENT  03/02/2022    OOPHORECTOMY Bilateral     THYROIDECTOMY, PARTIAL      TONSILLECTOMY      TOTAL ABDOMINAL HYSTERECTOMY W/ BILATERAL SALPINGOOPHORECTOMY      TRACHEOSTOMY  03/02/2022       Review of patient's allergies indicates:   Allergen Reactions    Iodine Hives    Penicillins Hives     Tolerating  Zosyn and Augmentin      Sulfa (sulfonamide antibiotics) Hives    Amlodipine     Aspirin Other (See Comments)     bleeding  Other reaction(s): Bleeding (finding)  Von Willebrand Disease  Von Willebrand Disease  Von Willebrand Disease      Digoxin     Lidocaine     Phenylephrine Hives    Phenylephrine hcl Hives    Procaine Other (See Comments)    Triamterene     Valacyclovir Other (See Comments)     unknown    Prednisone Palpitations and Other (See Comments)     Other  "reaction(s): Polyvinyl chloride (substance), Premature atrial contraction (disorder), Tachycardia (finding)         No outpatient medications have been marked as taking for the 3/8/23 encounter (Office Visit) with Krystal Sena MD.       Social History     Socioeconomic History    Marital status:      Spouse name: Dylan    Number of children: 4   Tobacco Use    Smoking status: Former     Types: Cigarettes     Passive exposure: Past    Smokeless tobacco: Never   Substance and Sexual Activity    Drug use: Never    Sexual activity: Not Currently        Family History   Problem Relation Age of Onset    Kidney disease Mother     Kidney disease Father         Patient Care Team:  Krystal Sena MD as PCP - General (Family Medicine)       Subjective:     ROS  General: denies f/c, weight loss, night sweats, decreased appetite  Eye: denies blurred vision, changes in vision  Respiratory: denies sob, wheezing, cough  Cardiovascular: denies chest pain, palpitations, edema  Gastrointestinal: denies abdominal pain, n/v, constipation, diarrhea  Integumentary: denies rashes, pruritis      Patient Reported Health Risk Assessment       Objective:     /65 (BP Location: Left arm, Patient Position: Sitting, BP Method: Medium (Manual))   Pulse 89   Temp 98.4 °F (36.9 °C) (Temporal)   Resp 16   Ht 5' 2" (1.575 m)   Wt 73.3 kg (161 lb 9.6 oz)   LMP  (LMP Unknown)   SpO2 96%   BMI 29.56 kg/m²     Physical Exam  Constitutional: NAD, alert, pleasant  Respiratory: CTAB, no wheezes, rales or rhonchi. No accessory muscle use  Eyes: EOMI  Integumentary: warm, dry, intact  Psych: AA&Ox3  + fluctuance of right middle finger cuticle. No obvious paronychia but some mild erythema is present. No tenderness. From of joint is present    I used a 22 g needle to drain lesion. There was scant clear fluid. No pus. Used sterile technique    No flowsheet data found.  Fall Risk Assessment - Outpatient 3/8/2023 12/21/2022 " 12/7/2022   Mobility Status Ambulatory Ambulatory Ambulatory   Number of falls 0 0 0   Identified as fall risk 0 0 0              Assessment/Plan:     1. Medicare annual wellness visit, initial    2. History of cerebrovascular accident (CVA) with residual deficit  Overview:  Occurred while inpatient with covid. Now has aphasia.     Assessment & Plan:  Patient is currently following with neurology, on lipitor and plavix  Continue current rx meds      3. Paroxysmal atrial fibrillation  Overview:  On beta blockers and digoxin. Symptoms are stable. Sees Dr. Richards.     Assessment & Plan:  Continue current rx meds      4. Essential hypertension  Overview:  At goal on current meds. Sees Dr. Richards    Assessment & Plan:  Continue current rx meds      5. Chronic systolic congestive heart failure  Overview:  Stable on current meds. Sees. Dr. Richards.    Assessment & Plan:  Continue current rx meds      6. Stage 3b chronic kidney disease  Overview:  Sees nephrology. No longer on dialysis. CKD is stable.     Assessment & Plan:  Labs ordered  Will continue to monitor renal indices  Avoid NSAIDS (aleve, motrin, naproxen, mobic, ibuprofen). It is ok to take in small doses on occasion, but check with your doctor first  Increase water intake to 80 oz of water daily  Adhere to a low protein diet        7. Von Willebrand disease  Overview:  She sees Dr. Maya with [facility] for hematology. On plavix    Assessment & Plan:  Continue plavix and f/u with hematology      8. Mayen's esophagus without dysplasia  Overview:  Sees Dr. Brooks. ON protonix and carafate. Symptoms are well controlled    Assessment & Plan:  Continue current rx meds and gerd diet  Stick to a bland diet. Avoid spicy foods, red sauces, sodas, chocolate, pepper, extra seasoning on food, hot chips.   Eat things like rice, bread, soup, baked meats, water.  Take meds as directed.  Avoid nsaids and aspirin unless medically necessary. If necessary, take with food for the  shortest duration.         9. Postmenopausal    10. Personal history of colonic polyps  Overview:  Had colonoscopy in 2021 with one sessile polyp. Due for repeat in 2026      11. Postoperative hypothyroidism  Overview:  S/p partial thyroidectomy for benign nodule. Sees Dr. Negrete in [place]. No recent tsh for review. She is on armour thyroid 15 mg daily    Assessment & Plan:  Continue current rx med and f/u with endo             Medicare Annual Wellness and Personalized Prevention Plan:   Fall Risk + Home Safety + Hearing Impairment + Depression Screen + Opioid and Substance Abuse Screening + Cognitive Impairment Screen + Health Risk Assessment all reviewed.     Health Maintenance Topics with due status: Not Due       Topic Last Completion Date    Colorectal Cancer Screening 09/28/2021    Mammogram 09/13/2022    Lipid Panel 10/07/2022    Hemoglobin A1c (Diabetic Prevention Screening) 01/11/2023    High Dose Statin 03/07/2023      The patient's Health Maintenance was reviewed and the following appears to be due at this time:   Health Maintenance Due   Topic Date Due    Hepatitis C Screening  Never done    TETANUS VACCINE  Never done    Shingles Vaccine (1 of 2) Never done    DEXA Scan  Never done       Advance Care Planning   I attest to discussing Advance Care Planning with patient and/or family member.  Education was provided including the importance of the Health Care Power of , Advance Directives, and/or [place] documentation.  The patient expressed understanding to the importance of this information and discussion.         No follow-ups on file. In addition to their scheduled follow up, the patient has also been instructed to follow up on as needed basis.

## 2023-03-09 ENCOUNTER — TELEPHONE (OUTPATIENT)
Dept: FAMILY MEDICINE | Facility: CLINIC | Age: 68
End: 2023-03-09
Payer: MEDICARE

## 2023-03-09 DIAGNOSIS — R53.81 PHYSICAL DECONDITIONING: Primary | ICD-10-CM

## 2023-03-09 NOTE — TELEPHONE ENCOUNTER
----- Message from Sissy Thornton sent at 3/9/2023  1:14 PM CST -----  Regarding: physical therapy  Type:  Needs Medical Advice    Who Called: pt    Would the patient rather a call back or a response via [facility]?   Best Call Back Number: [phone]  Additional Information: pt stated she was calling back to speak to Terry. But the back line was busy...she wanted to let him know she would like to go to therapy at [facility].  Their contact number [phone]. The patient feels this will help her to get around better.  Please call patient when available

## 2023-03-13 PROBLEM — R47.01 APHASIA DUE TO ACUTE CEREBROVASCULAR ACCIDENT (CVA): Status: RESOLVED | Noted: 2022-12-07 | Resolved: 2023-03-13

## 2023-03-13 PROBLEM — I63.9 APHASIA DUE TO ACUTE CEREBROVASCULAR ACCIDENT (CVA): Status: RESOLVED | Noted: 2022-12-07 | Resolved: 2023-03-13

## 2023-03-31 ENCOUNTER — LAB VISIT (OUTPATIENT)
Dept: LAB | Facility: HOSPITAL | Age: 68
End: 2023-03-31
Attending: FAMILY MEDICINE
Payer: MEDICARE

## 2023-03-31 DIAGNOSIS — Z00.00 MEDICARE ANNUAL WELLNESS VISIT, INITIAL: ICD-10-CM

## 2023-03-31 DIAGNOSIS — E89.0 POSTOPERATIVE HYPOTHYROIDISM: ICD-10-CM

## 2023-03-31 DIAGNOSIS — R73.9 HYPERGLYCEMIA: ICD-10-CM

## 2023-03-31 DIAGNOSIS — Z11.59 NEED FOR HEPATITIS C SCREENING TEST: ICD-10-CM

## 2023-03-31 DIAGNOSIS — N18.32 STAGE 3B CHRONIC KIDNEY DISEASE: ICD-10-CM

## 2023-03-31 LAB
ANION GAP SERPL CALC-SCNC: 9 MEQ/L
BASOPHILS # BLD AUTO: 0.03 X10(3)/MCL (ref 0–0.2)
BASOPHILS NFR BLD AUTO: 0.4 %
BUN SERPL-MCNC: 24.9 MG/DL (ref 9.8–20.1)
CALCIUM SERPL-MCNC: 10.1 MG/DL (ref 8.4–10.2)
CHLORIDE SERPL-SCNC: 104 MMOL/L (ref 98–107)
CHOLEST SERPL-MCNC: 213 MG/DL
CHOLEST/HDLC SERPL: 3 {RATIO} (ref 0–5)
CO2 SERPL-SCNC: 29 MMOL/L (ref 23–31)
CREAT SERPL-MCNC: 1.42 MG/DL (ref 0.55–1.02)
CREAT/UREA NIT SERPL: 18
EOSINOPHIL # BLD AUTO: 0.19 X10(3)/MCL (ref 0–0.9)
EOSINOPHIL NFR BLD AUTO: 2.7 %
ERYTHROCYTE [DISTWIDTH] IN BLOOD BY AUTOMATED COUNT: 12.2 % (ref 11.5–17)
EST. AVERAGE GLUCOSE BLD GHB EST-MCNC: 108.3 MG/DL
GFR SERPLBLD CREATININE-BSD FMLA CKD-EPI: 40 MLS/MIN/1.73/M2
GLUCOSE SERPL-MCNC: 107 MG/DL (ref 82–115)
HBA1C MFR BLD: 5.4 %
HCT VFR BLD AUTO: 37.6 % (ref 37–47)
HCV AB SERPL QL IA: NONREACTIVE
HDLC SERPL-MCNC: 66 MG/DL (ref 35–60)
HGB BLD-MCNC: 12.1 G/DL (ref 12–16)
IMM GRANULOCYTES # BLD AUTO: 0.01 X10(3)/MCL (ref 0–0.04)
IMM GRANULOCYTES NFR BLD AUTO: 0.1 %
LDLC SERPL CALC-MCNC: 133 MG/DL (ref 50–140)
LYMPHOCYTES # BLD AUTO: 2.46 X10(3)/MCL (ref 0.6–4.6)
LYMPHOCYTES NFR BLD AUTO: 34.9 %
MCH RBC QN AUTO: 31.1 PG (ref 27–31)
MCHC RBC AUTO-ENTMCNC: 32.2 G/DL (ref 33–36)
MCV RBC AUTO: 96.7 FL (ref 80–94)
MONOCYTES # BLD AUTO: 0.72 X10(3)/MCL (ref 0.1–1.3)
MONOCYTES NFR BLD AUTO: 10.2 %
NEUTROPHILS # BLD AUTO: 3.63 X10(3)/MCL (ref 2.1–9.2)
NEUTROPHILS NFR BLD AUTO: 51.7 %
NRBC BLD AUTO-RTO: 0 %
PLATELET # BLD AUTO: 292 X10(3)/MCL (ref 130–400)
PMV BLD AUTO: 9.8 FL (ref 7.4–10.4)
POTASSIUM SERPL-SCNC: 4.3 MMOL/L (ref 3.5–5.1)
RBC # BLD AUTO: 3.89 X10(6)/MCL (ref 4.2–5.4)
SODIUM SERPL-SCNC: 142 MMOL/L (ref 136–145)
TRIGL SERPL-MCNC: 70 MG/DL (ref 37–140)
TSH SERPL-ACNC: 3.3 UIU/ML (ref 0.35–4.94)
VLDLC SERPL CALC-MCNC: 14 MG/DL
WBC # SPEC AUTO: 7 X10(3)/MCL (ref 4.5–11.5)

## 2023-03-31 PROCEDURE — 36415 COLL VENOUS BLD VENIPUNCTURE: CPT

## 2023-03-31 PROCEDURE — 85025 COMPLETE CBC W/AUTO DIFF WBC: CPT

## 2023-03-31 PROCEDURE — 86803 HEPATITIS C AB TEST: CPT

## 2023-03-31 PROCEDURE — 80061 LIPID PANEL: CPT

## 2023-03-31 PROCEDURE — 80048 BASIC METABOLIC PNL TOTAL CA: CPT

## 2023-03-31 PROCEDURE — 83036 HEMOGLOBIN GLYCOSYLATED A1C: CPT

## 2023-03-31 PROCEDURE — 84443 ASSAY THYROID STIM HORMONE: CPT

## 2023-04-03 ENCOUNTER — TELEPHONE (OUTPATIENT)
Dept: FAMILY MEDICINE | Facility: CLINIC | Age: 68
End: 2023-04-03
Payer: MEDICARE

## 2023-04-03 DIAGNOSIS — D75.89 MACROCYTOSIS WITHOUT ANEMIA: Primary | ICD-10-CM

## 2023-04-03 NOTE — TELEPHONE ENCOUNTER
I called the 2 numbers we have on file for the patient. The mobil number was a voice mail. I left a message to call us back with our number. The work number just rang with no answer.

## 2023-04-03 NOTE — TELEPHONE ENCOUNTER
----- Message from Krystal Sena MD sent at 4/3/2023  7:53 AM CDT -----  Her labs show chronically mildly enlarged red blood cells. This could be a normal variant but I have ordered additional labs to work this up. She can show up to lab to do them at her convenience.     Kidney functions is stable. And all other labs are unremarkable.

## 2023-04-03 NOTE — TELEPHONE ENCOUNTER
I spoke with the patient and gave her the results of her blood test. She voiced understanding and said she will go do the labs in the morning.

## 2023-04-03 NOTE — PROGRESS NOTES
Her labs show chronically mildly enlarged red blood cells. This could be a normal variant but I have ordered additional labs to work this up. She can show up to lab to do them at her convenience.     Kidney functions is stable. And all other labs are unremarkable.

## 2023-04-03 NOTE — TELEPHONE ENCOUNTER
I called the patient to giver her the results. No answer, I left a voice mail to call us back with our number.

## 2023-04-04 DIAGNOSIS — D75.89 MACROCYTOSIS WITHOUT ANEMIA: Primary | ICD-10-CM

## 2023-04-06 ENCOUNTER — OFFICE VISIT (OUTPATIENT)
Dept: FAMILY MEDICINE | Facility: CLINIC | Age: 68
End: 2023-04-06
Payer: MEDICARE

## 2023-04-06 ENCOUNTER — TELEPHONE (OUTPATIENT)
Dept: FAMILY MEDICINE | Facility: CLINIC | Age: 68
End: 2023-04-06

## 2023-04-06 VITALS
WEIGHT: 163.81 LBS | OXYGEN SATURATION: 98 % | SYSTOLIC BLOOD PRESSURE: 144 MMHG | DIASTOLIC BLOOD PRESSURE: 77 MMHG | TEMPERATURE: 98 F | BODY MASS INDEX: 29.96 KG/M2 | HEART RATE: 61 BPM

## 2023-04-06 DIAGNOSIS — J84.10 FIBROSIS OF LUNG: ICD-10-CM

## 2023-04-06 DIAGNOSIS — J06.9 UPPER RESPIRATORY TRACT INFECTION, UNSPECIFIED TYPE: Primary | ICD-10-CM

## 2023-04-06 PROCEDURE — 99214 OFFICE O/P EST MOD 30 MIN: CPT | Mod: ,,, | Performed by: FAMILY MEDICINE

## 2023-04-06 PROCEDURE — 99214 PR OFFICE/OUTPT VISIT, EST, LEVL IV, 30-39 MIN: ICD-10-PCS | Mod: ,,, | Performed by: FAMILY MEDICINE

## 2023-04-06 RX ORDER — IPRATROPIUM BROMIDE AND ALBUTEROL SULFATE 2.5; .5 MG/3ML; MG/3ML
3 SOLUTION RESPIRATORY (INHALATION)
Qty: 75 ML | Refills: 1 | Status: SHIPPED | OUTPATIENT
Start: 2023-04-06 | End: 2023-04-16

## 2023-04-06 RX ORDER — AZITHROMYCIN 250 MG/1
TABLET, FILM COATED ORAL
Qty: 6 TABLET | Refills: 0 | Status: SHIPPED | OUTPATIENT
Start: 2023-04-06 | End: 2023-06-08

## 2023-04-06 RX ORDER — PROMETHAZINE HYDROCHLORIDE AND DEXTROMETHORPHAN HYDROBROMIDE 6.25; 15 MG/5ML; MG/5ML
5 SYRUP ORAL EVERY 6 HOURS PRN
Qty: 118 ML | Refills: 0 | Status: SHIPPED | OUTPATIENT
Start: 2023-04-06 | End: 2023-04-16

## 2023-04-06 RX ORDER — AZITHROMYCIN 250 MG/1
250 TABLET, FILM COATED ORAL DAILY
Status: CANCELLED
Start: 2023-04-06

## 2023-04-06 NOTE — PROGRESS NOTES
Alondra Vigil  04/06/2023  [id number]    TG GARCIA MD  Patient Care Team:  Tg Garcia MD as PCP - General (Family Medicine)  Sheldon Richards MD as Consulting Physician (Cardiology)  Mary Anne Benedict LPN as Care Coordinator      Chief Complaint:  Chief Complaint   Patient presents with    Sinusitis    Cough    watery eyes    Sinus Problem       History of Present Illness:  HPI    67 y.o. female who presents today c/o postnasal drip, cough, watery eyes x 4 days. Has some wheezing and shortness of breath. NO f/c, myalgias. She feels like she is getting worse. She is taking prescription cough syrup, singulair and claritin.     Review of Systems  General: denies f/c, weight loss, night sweats, decreased appetite  Eye: denies blurred vision, changes in vision  Cardiovascular: denies chest pain, palpitations, edema  Gastrointestinal: denies abdominal pain, n/v, constipation, diarrhea  Integumentary: denies rashes, pruritis        Health Maintenance  Health Maintenance Topics with due status: Not Due       Topic Last Completion Date    Colorectal Cancer Screening 09/28/2021    Mammogram 09/13/2022    DEXA Scan 12/28/2022    High Dose Statin 03/07/2023    Hemoglobin A1c (Diabetic Prevention Screening) 03/31/2023    Lipid Panel 03/31/2023     Health Maintenance Due   Topic Date Due    TETANUS VACCINE  Never done       Exam:  Vitals:    04/06/23 1341 04/06/23 1345   BP: (!) 163/80 (!) 144/77   BP Location: Right arm Left arm   Pulse: (!) 58 61   Temp: 97.5 °F (36.4 °C)    SpO2: 98%    Weight: 74.3 kg (163 lb 12.8 oz)      Weight: 74.3 kg (163 lb 12.8 oz)   Body mass index is 29.96 kg/m².      Physical Exam  Constitutional: NAD, alert, pleasant  Respiratory: CTAB, no wheezes, rales or rhonchi. No accessory muscle use. + cough  Eyes: EOMI  Cardiovascular: RRR, No m/r/g. No JVD. No LE edema  Integumentary: warm, dry, intact  Psych: AA&Ox3      ICD-10-CM ICD-9-CM   1. Upper respiratory tract infection,  unspecified type  J06.9 465.9   2. Fibrosis of lung  J84.10 515       1. Upper respiratory tract infection, unspecified type  Comments:  gabriela prn cough/sob, cough syrup sent in, z-griselda due to underlying lung fibrosis  Orders:  -     albuterol-ipratropium (DUO-NEB) 2.5 mg-0.5 mg/3 mL nebulizer solution; Take 3 mLs by nebulization every 6 (six) hours while awake. for 10 days  Dispense: 75 mL; Refill: 1  -     promethazine-dextromethorphan (PROMETHAZINE-DM) 6.25-15 mg/5 mL Syrp; Take 5 mLs by mouth every 6 (six) hours as needed (cough). Prn cough  Dispense: 118 mL; Refill: 0  -     azithromycin (Z-GRISELDA) 250 MG tablet; Take 2 tablets by mouth on day 1; Take 1 tablet by mouth on days 2-5  Dispense: 6 tablet; Refill: 0    2. Fibrosis of lung  -     albuterol-ipratropium (DUO-NEB) 2.5 mg-0.5 mg/3 mL nebulizer solution; Take 3 mLs by nebulization every 6 (six) hours while awake. for 10 days  Dispense: 75 mL; Refill: 1  -     promethazine-dextromethorphan (PROMETHAZINE-DM) 6.25-15 mg/5 mL Syrp; Take 5 mLs by mouth every 6 (six) hours as needed (cough). Prn cough  Dispense: 118 mL; Refill: 0  -     azithromycin (Z-GRISELDA) 250 MG tablet; Take 2 tablets by mouth on day 1; Take 1 tablet by mouth on days 2-5  Dispense: 6 tablet; Refill: 0         Follow up: No follow-ups on file.    Referred to pulm for lung fibrosis  Care Plan/Goals: Reviewed   Goals    None

## 2023-04-06 NOTE — TELEPHONE ENCOUNTER
Pt was called for results , she stated that she needed something for continuous cough and sinus .

## 2023-04-10 ENCOUNTER — TELEPHONE (OUTPATIENT)
Dept: FAMILY MEDICINE | Facility: CLINIC | Age: 68
End: 2023-04-10
Payer: MEDICARE

## 2023-04-10 DIAGNOSIS — R06.02 SHORTNESS OF BREATH: ICD-10-CM

## 2023-04-10 DIAGNOSIS — J84.10 FIBROSIS OF LUNG: Primary | ICD-10-CM

## 2023-04-10 RX ORDER — IPRATROPIUM BROMIDE AND ALBUTEROL SULFATE 2.5; .5 MG/3ML; MG/3ML
3 SOLUTION RESPIRATORY (INHALATION)
Qty: 75 ML | Refills: 0 | Status: SHIPPED | OUTPATIENT
Start: 2023-04-10 | End: 2023-04-18 | Stop reason: SDUPTHER

## 2023-04-10 NOTE — TELEPHONE ENCOUNTER
PA was attempted for the Albuterol 0.083% neb tx and was denied. Please advise if you would like to change it to something else.

## 2023-04-18 ENCOUNTER — TELEPHONE (OUTPATIENT)
Dept: FAMILY MEDICINE | Facility: CLINIC | Age: 68
End: 2023-04-18
Payer: MEDICARE

## 2023-04-18 DIAGNOSIS — J84.10 FIBROSIS OF LUNG: ICD-10-CM

## 2023-04-18 DIAGNOSIS — R06.02 SHORTNESS OF BREATH: ICD-10-CM

## 2023-04-18 RX ORDER — IPRATROPIUM BROMIDE AND ALBUTEROL SULFATE 2.5; .5 MG/3ML; MG/3ML
3 SOLUTION RESPIRATORY (INHALATION)
Qty: 75 ML | Refills: 0 | Status: SHIPPED | OUTPATIENT
Start: 2023-04-18 | End: 2023-10-18 | Stop reason: SDUPTHER

## 2023-04-18 NOTE — TELEPHONE ENCOUNTER
----- Message from Kristy Mclean LPN sent at 4/17/2023  4:15 PM CDT -----  Regarding: FW: refill    ----- Message -----  From: Laura Hansen  Sent: 4/17/2023   2:36 PM CDT  To: [facility] Staff  Subject: refill                                           .Type:  Needs Medical Advice    Who Called: pt      Would the patient rather a call back? Yes    Best Call Back Number:  [phone]    Additional Information: albuterol (PROVENTIL/VENTOLIN HFA) 90 mcg/actuation inhaler please call pt she is not sure if the doctor wants her refill the inhaler or the nebulizer she is not sure want to refill

## 2023-04-18 NOTE — TELEPHONE ENCOUNTER
I spoke with patient and she didn't fill the duoneb. Patient is feeling better and just has a cough in the morning. She asked if she could keep it on hand for when it does get bad. Please advise. Alexis

## 2023-04-24 ENCOUNTER — TELEPHONE (OUTPATIENT)
Dept: FAMILY MEDICINE | Facility: CLINIC | Age: 68
End: 2023-04-24
Payer: MEDICARE

## 2023-04-24 DIAGNOSIS — J84.10 FIBROSIS OF LUNG: Primary | ICD-10-CM

## 2023-04-24 NOTE — TELEPHONE ENCOUNTER
----- Message from Cayla Heard sent at 4/24/2023 11:22 AM CDT -----  Regarding: call  Pt would like a nebulizer pt called in with a number to call [phone] to help with equipment.  PULMONOLOGY- Dr. Cook office told pt that they don't have Referral please re- send

## 2023-04-24 NOTE — TELEPHONE ENCOUNTER
Spoke with Patient she states she has the nebulizer solution but she needs a rx sent for the machine. States medicare will cover that with an rx. Wants it sent to [facility] in [place] if possible if not then would like it sent to [facility].

## 2023-06-06 ENCOUNTER — LAB VISIT (OUTPATIENT)
Dept: LAB | Facility: HOSPITAL | Age: 68
End: 2023-06-06
Attending: INTERNAL MEDICINE
Payer: MEDICARE

## 2023-06-06 DIAGNOSIS — N18.9 ANEMIA OF CHRONIC RENAL FAILURE, UNSPECIFIED CKD STAGE: Primary | ICD-10-CM

## 2023-06-06 DIAGNOSIS — N18.31 CHRONIC KIDNEY DISEASE (CKD) STAGE G3A/A1, MODERATELY DECREASED GLOMERULAR FILTRATION RATE (GFR) BETWEEN 45-59 ML/MIN/1.73 SQUARE METER AND ALBUMINURIA CREATININE RATIO LESS THAN 30 MG/G: ICD-10-CM

## 2023-06-06 DIAGNOSIS — E55.9 AVITAMINOSIS D: ICD-10-CM

## 2023-06-06 DIAGNOSIS — I12.9 PARENCHYMAL RENAL HYPERTENSION: ICD-10-CM

## 2023-06-06 DIAGNOSIS — E78.5 HYPERLIPIDEMIA, UNSPECIFIED HYPERLIPIDEMIA TYPE: ICD-10-CM

## 2023-06-06 DIAGNOSIS — E21.3 HYPERPARATHYROIDISM, UNSPECIFIED: ICD-10-CM

## 2023-06-06 DIAGNOSIS — D63.1 ANEMIA OF CHRONIC RENAL FAILURE, UNSPECIFIED CKD STAGE: Primary | ICD-10-CM

## 2023-06-06 LAB
ALBUMIN SERPL-MCNC: 3.8 G/DL (ref 3.4–4.8)
ALBUMIN/GLOB SERPL: 1.6 RATIO (ref 1.1–2)
ALP SERPL-CCNC: 77 UNIT/L (ref 40–150)
ALT SERPL-CCNC: 18 UNIT/L (ref 0–55)
APPEARANCE UR: CLEAR
AST SERPL-CCNC: 18 UNIT/L (ref 5–34)
BACTERIA #/AREA URNS AUTO: NORMAL /HPF
BASOPHILS # BLD AUTO: 0.03 X10(3)/MCL
BASOPHILS NFR BLD AUTO: 0.5 %
BILIRUB UR QL STRIP.AUTO: NEGATIVE MG/DL
BILIRUBIN DIRECT+TOT PNL SERPL-MCNC: 0.4 MG/DL
BUN SERPL-MCNC: 22.3 MG/DL (ref 9.8–20.1)
CALCIUM SERPL-MCNC: 9.4 MG/DL (ref 8.4–10.2)
CHLORIDE SERPL-SCNC: 106 MMOL/L (ref 98–107)
CHOLEST SERPL-MCNC: 214 MG/DL
CHOLEST/HDLC SERPL: 4 {RATIO} (ref 0–5)
CO2 SERPL-SCNC: 29 MMOL/L (ref 23–31)
COLOR UR: YELLOW
CREAT SERPL-MCNC: 1.41 MG/DL (ref 0.55–1.02)
DEPRECATED CALCIDIOL+CALCIFEROL SERPL-MC: 44.5 NG/ML (ref 30–80)
EOSINOPHIL # BLD AUTO: 0.09 X10(3)/MCL (ref 0–0.9)
EOSINOPHIL NFR BLD AUTO: 1.5 %
ERYTHROCYTE [DISTWIDTH] IN BLOOD BY AUTOMATED COUNT: 12.4 % (ref 11.5–17)
GFR SERPLBLD CREATININE-BSD FMLA CKD-EPI: 40 MLS/MIN/1.73/M2
GLOBULIN SER-MCNC: 2.4 GM/DL (ref 2.4–3.5)
GLUCOSE SERPL-MCNC: 101 MG/DL (ref 82–115)
GLUCOSE UR QL STRIP.AUTO: NEGATIVE MG/DL
HCT VFR BLD AUTO: 36.7 % (ref 37–47)
HDLC SERPL-MCNC: 60 MG/DL (ref 35–60)
HGB BLD-MCNC: 11.9 G/DL (ref 12–16)
IMM GRANULOCYTES # BLD AUTO: 0.01 X10(3)/MCL (ref 0–0.04)
IMM GRANULOCYTES NFR BLD AUTO: 0.2 %
KETONES UR QL STRIP.AUTO: NEGATIVE MG/DL
LDLC SERPL CALC-MCNC: 136 MG/DL (ref 50–140)
LEUKOCYTE ESTERASE UR QL STRIP.AUTO: ABNORMAL UNIT/L
LYMPHOCYTES # BLD AUTO: 1.51 X10(3)/MCL (ref 0.6–4.6)
LYMPHOCYTES NFR BLD AUTO: 25.5 %
MCH RBC QN AUTO: 31.2 PG (ref 27–31)
MCHC RBC AUTO-ENTMCNC: 32.4 G/DL (ref 33–36)
MCV RBC AUTO: 96.1 FL (ref 80–94)
MONOCYTES # BLD AUTO: 0.55 X10(3)/MCL (ref 0.1–1.3)
MONOCYTES NFR BLD AUTO: 9.3 %
NEUTROPHILS # BLD AUTO: 3.72 X10(3)/MCL (ref 2.1–9.2)
NEUTROPHILS NFR BLD AUTO: 63 %
NITRITE UR QL STRIP.AUTO: NEGATIVE
NRBC BLD AUTO-RTO: 0 %
PH UR STRIP.AUTO: 6.5 [PH]
PLATELET # BLD AUTO: 263 X10(3)/MCL (ref 130–400)
PMV BLD AUTO: 9.5 FL (ref 7.4–10.4)
POTASSIUM SERPL-SCNC: 4.2 MMOL/L (ref 3.5–5.1)
PROT SERPL-MCNC: 6.2 GM/DL (ref 5.8–7.6)
PROT UR QL STRIP.AUTO: NEGATIVE MG/DL
PTH-INTACT SERPL-MCNC: 72.5 PG/ML (ref 8.7–77)
RBC # BLD AUTO: 3.82 X10(6)/MCL (ref 4.2–5.4)
RBC UR QL AUTO: NEGATIVE UNIT/L
SODIUM SERPL-SCNC: 141 MMOL/L (ref 136–145)
SP GR UR STRIP.AUTO: 1.02 (ref 1–1.03)
SQUAMOUS #/AREA URNS AUTO: <5 /HPF
TRIGL SERPL-MCNC: 91 MG/DL (ref 37–140)
URATE SERPL-MCNC: 6.8 MG/DL (ref 2.6–6)
UROBILINOGEN UR STRIP-ACNC: 0.2 MG/DL
VLDLC SERPL CALC-MCNC: 18 MG/DL
WBC # SPEC AUTO: 5.91 X10(3)/MCL (ref 4.5–11.5)
WBC #/AREA URNS AUTO: <5 /HPF

## 2023-06-06 PROCEDURE — 80053 COMPREHEN METABOLIC PANEL: CPT

## 2023-06-06 PROCEDURE — 81001 URINALYSIS AUTO W/SCOPE: CPT

## 2023-06-06 PROCEDURE — 36415 COLL VENOUS BLD VENIPUNCTURE: CPT

## 2023-06-06 PROCEDURE — 80061 LIPID PANEL: CPT

## 2023-06-06 PROCEDURE — 85025 COMPLETE CBC W/AUTO DIFF WBC: CPT

## 2023-06-06 PROCEDURE — 82306 VITAMIN D 25 HYDROXY: CPT

## 2023-06-06 PROCEDURE — 84550 ASSAY OF BLOOD/URIC ACID: CPT

## 2023-06-06 PROCEDURE — 83970 ASSAY OF PARATHORMONE: CPT

## 2023-06-08 ENCOUNTER — OFFICE VISIT (OUTPATIENT)
Dept: FAMILY MEDICINE | Facility: CLINIC | Age: 68
End: 2023-06-08
Payer: MEDICARE

## 2023-06-08 VITALS
HEIGHT: 62 IN | DIASTOLIC BLOOD PRESSURE: 79 MMHG | HEART RATE: 61 BPM | WEIGHT: 167.69 LBS | OXYGEN SATURATION: 97 % | RESPIRATION RATE: 20 BRPM | TEMPERATURE: 99 F | BODY MASS INDEX: 30.86 KG/M2 | SYSTOLIC BLOOD PRESSURE: 138 MMHG

## 2023-06-08 DIAGNOSIS — R25.2 MUSCLE CRAMPS AT NIGHT: ICD-10-CM

## 2023-06-08 DIAGNOSIS — J01.00 ACUTE NON-RECURRENT MAXILLARY SINUSITIS: Primary | ICD-10-CM

## 2023-06-08 DIAGNOSIS — N39.492 POSTURAL URINARY INCONTINENCE: ICD-10-CM

## 2023-06-08 PROBLEM — E21.3 HYPERPARATHYROIDISM: Status: ACTIVE | Noted: 2023-06-08

## 2023-06-08 PROCEDURE — 99214 PR OFFICE/OUTPT VISIT, EST, LEVL IV, 30-39 MIN: ICD-10-PCS | Mod: ,,, | Performed by: FAMILY MEDICINE

## 2023-06-08 PROCEDURE — 99214 OFFICE O/P EST MOD 30 MIN: CPT | Mod: ,,, | Performed by: FAMILY MEDICINE

## 2023-06-08 RX ORDER — CEFDINIR 300 MG/1
300 CAPSULE ORAL 2 TIMES DAILY
Qty: 20 CAPSULE | Refills: 0 | Status: SHIPPED | OUTPATIENT
Start: 2023-06-08 | End: 2023-06-18

## 2023-06-08 NOTE — PROGRESS NOTES
Alondra Vigil  06/08/2023  [id number]    TG GARCIA MD  Patient Care Team:  Tg Garcia MD as PCP - General (Family Medicine)  Sheldon Richards MD as Consulting Physician (Cardiology)  Mary Anne Benedict LPN as Care Coordinator      Chief Complaint:  Chief Complaint   Patient presents with    URI     C/O chest congestion, cough, wheezing, and some SOB       History of Present Illness:  HPI    68 y.o. female who presents today c/o nasal/chest congestion, coughing, wheezing and some shortness of breath. Also c/o maxillary sinus pressure with congestion. Her  was sick one week ago and patient's symptoms started about the same time but she is getting worse. She is taking otc meds and singulair with no improvement. Cough is productive of clear phlegm.     She also c/o wilfrido horses at bedtime when lying down. On aldactone, not on statin. Electrolytes are normal. She does not drink enough water.     She continues to have urinary incontinence. States she lays supine and when she gets up she loses complete control of her bladder and leaks everywhere.     She also asked me to review labs done recently by Dr. Majano. She is not taking statin but states carediologist wants her to. She states her lipids are normal. She has history of CVA.     Review of Systems  General: denies f/c, weight loss, night sweats, decreased appetite  Eye: denies blurred vision, changes in vision  Cardiovascular: denies chest pain, palpitations, edema  Gastrointestinal: denies abdominal pain, n/v, constipation, diarrhea  Integumentary: denies rashes, pruritis        Health Maintenance  Health Maintenance Topics with due status: Not Due       Topic Last Completion Date    Colorectal Cancer Screening 09/28/2021    Mammogram 09/13/2022    DEXA Scan 12/28/2022    Hemoglobin A1c (Diabetic Prevention Screening) 03/31/2023    High Dose Statin 04/25/2023    Lipid Panel 06/06/2023    Influenza Vaccine Not Due     Health  "Maintenance Due   Topic Date Due    TETANUS VACCINE  Never done       Exam:  Vitals:    06/08/23 1330   BP: 138/79   BP Location: Left arm   Patient Position: Sitting   BP Method: Large (Automatic)   Pulse: 61   Resp: 20   Temp: 98.6 °F (37 °C)   TempSrc: Oral   SpO2: 97%   Weight: 76.1 kg (167 lb 11.2 oz)   Height: 5' 2" (1.575 m)     Weight: 76.1 kg (167 lb 11.2 oz)   Body mass index is 30.67 kg/m².      Physical Exam  Constitutional: NAD, alert, pleasant  Respiratory: CTAB, no wheezes, rales or rhonchi. No accessory muscle use. + cough  Eyes: EOMI  Cardiovascular: RRR, No m/r/g. No JVD. No LE edema  Gastrointestinal: BS+, nontender, nondistended  Integumentary: warm, dry, intact  Psych: AA&Ox3      ICD-10-CM ICD-9-CM   1. Acute non-recurrent maxillary sinusitis  J01.00 461.0   2. Muscle cramps at night  R25.2 729.82   3. Postural urinary incontinence  N39.492 788.39       1. Acute non-recurrent maxillary sinusitis  Comments:  start omnicef. continue plain mucinex and cough syrup. use duonebs prn cough/wheezing  Orders:  -     cefdinir (OMNICEF) 300 MG capsule; Take 1 capsule (300 mg total) by mouth 2 (two) times daily. for 10 days  Dispense: 20 capsule; Refill: 0    2. Muscle cramps at night  Comments:  increase water intake to  oz daily    3. Postural urinary incontinence  Comments:  refer to urogyn  Orders:  -     Ambulatory referral/consult to Urogynecology; Future; Expected date: 06/08/2023         Follow up: No follow-ups on file.      Care Plan/Goals: Reviewed   Goals    None               "

## 2023-06-09 ENCOUNTER — TELEPHONE (OUTPATIENT)
Dept: UROGYNECOLOGY | Facility: CLINIC | Age: 68
End: 2023-06-09
Payer: MEDICARE

## 2023-06-12 ENCOUNTER — TELEPHONE (OUTPATIENT)
Dept: FAMILY MEDICINE | Facility: CLINIC | Age: 68
End: 2023-06-12
Payer: MEDICARE

## 2023-06-12 RX ORDER — AZITHROMYCIN 250 MG/1
TABLET, FILM COATED ORAL
Qty: 6 TABLET | Refills: 0 | Status: SHIPPED | OUTPATIENT
Start: 2023-06-12 | End: 2023-06-17

## 2023-06-12 NOTE — TELEPHONE ENCOUNTER
----- Message from Hannah Hansen sent at 6/12/2023 11:38 AM CDT -----  .Type:  Needs Medical Advice    Who Called: pt  Symptoms (please be specific):    How long has patient had these symptoms:    Pharmacy name and phone #:  [facility] in [facility]  Would the patient rather a call back or a response via [facility]?   Best Call Back Number: [phone]  Additional Information: pt asking for z-pack

## 2023-06-12 NOTE — TELEPHONE ENCOUNTER
Pt notified that the z-pack was sent to the pharmacy. She was also notified of the recommendations. She is willing to be referred to the allergist. Please advise.

## 2023-06-12 NOTE — TELEPHONE ENCOUNTER
"Pt states that she did not take the Omnicef because the pharmacist told her that because she has an allergy to PCN that it would be "very dangerous" for her to take it. This is why she is asking for a z-pack. Please advise  "

## 2023-06-12 NOTE — TELEPHONE ENCOUNTER
I am aware of the possible reaction, but please let patient know that cephalosporins only have a 1-3% cross reactivity to cause an allergic reaction if patient is allergic to penicillin.     Also, patient takes augmentin, therefore, she likely does not have a true penicillin allergy.     However, I did send in a z-griselda.     Patient has a very long list of allergies in chart. I would be happy to send her to an allergist to test her for a true penicillin allergy. Her chart states that she can tolerate augmentin, thus, I do not think she  has a true allergy. Listing penicillin allergy in her chart really limits us to what we can prescribe so it would benefit her to do this test.

## 2023-06-13 DIAGNOSIS — Z88.0 HISTORY OF PENICILLIN ALLERGY: Primary | ICD-10-CM

## 2023-06-13 NOTE — TELEPHONE ENCOUNTER
Referral sent. LM on pts VM letting her know. Advised for her to return the call if she has any questions

## 2023-07-05 ENCOUNTER — TELEPHONE (OUTPATIENT)
Dept: FAMILY MEDICINE | Facility: CLINIC | Age: 68
End: 2023-07-05
Payer: MEDICARE

## 2023-07-05 NOTE — TELEPHONE ENCOUNTER
Spoke to pt. I advised her that the referral was sent to Dr Gabriela Lowe and I gave her the phone number, [phone]

## 2023-07-05 NOTE — TELEPHONE ENCOUNTER
----- Message from [facility] sent at 7/5/2023  1:12 PM CDT -----  .Type:  Needs Medical Advice    Who Called:  pt      Would the patient rather a call back? Yes    Best Call Back Number:  [phone]    Additional Information:  pt needs to number to the allergy clinic that you referred her to her appt is in November

## 2023-07-13 ENCOUNTER — TELEPHONE (OUTPATIENT)
Dept: FAMILY MEDICINE | Facility: CLINIC | Age: 68
End: 2023-07-13
Payer: MEDICARE

## 2023-07-13 NOTE — TELEPHONE ENCOUNTER
----- Message from April Stewart sent at 7/13/2023  2:36 PM CDT -----  Regarding: referral request  .Type:  Patient Requesting Referral    Who Called: patient  Does the patient already have the specialty appointment scheduled?: no  If yes, what is the date of that appointment?:  Referral to What Specialty: endocrinologist  Reason for Referral:thyroid medication since 11 y/o  Does the patient want the referral with a specific physician?: Dr. Deandre Carroll  Is the specialist an [facility]?:  Patient Requesting a Response?: yes  Would the patient rather a call back or a response via [facility]?  Call back  Best Call Back Number: [phone]  Additional Information: patient is requesting a new endocrinologist due to distance. Please advise.

## 2023-07-14 DIAGNOSIS — E89.0 POSTOPERATIVE HYPOTHYROIDISM: ICD-10-CM

## 2023-07-14 DIAGNOSIS — E21.3 HYPERPARATHYROIDISM: Primary | ICD-10-CM

## 2023-08-03 ENCOUNTER — OFFICE VISIT (OUTPATIENT)
Dept: FAMILY MEDICINE | Facility: CLINIC | Age: 68
End: 2023-08-03
Payer: MEDICARE

## 2023-08-03 ENCOUNTER — TELEPHONE (OUTPATIENT)
Dept: FAMILY MEDICINE | Facility: CLINIC | Age: 68
End: 2023-08-03

## 2023-08-03 ENCOUNTER — LAB VISIT (OUTPATIENT)
Dept: LAB | Facility: HOSPITAL | Age: 68
End: 2023-08-03
Attending: FAMILY MEDICINE
Payer: MEDICARE

## 2023-08-03 VITALS
BODY MASS INDEX: 30.61 KG/M2 | HEART RATE: 59 BPM | DIASTOLIC BLOOD PRESSURE: 80 MMHG | WEIGHT: 166.31 LBS | HEIGHT: 62 IN | SYSTOLIC BLOOD PRESSURE: 130 MMHG | RESPIRATION RATE: 20 BRPM | TEMPERATURE: 98 F | OXYGEN SATURATION: 98 %

## 2023-08-03 DIAGNOSIS — Z13.220 ENCOUNTER FOR LIPID SCREENING FOR CARDIOVASCULAR DISEASE: ICD-10-CM

## 2023-08-03 DIAGNOSIS — Z13.6 ENCOUNTER FOR LIPID SCREENING FOR CARDIOVASCULAR DISEASE: ICD-10-CM

## 2023-08-03 DIAGNOSIS — E89.0 POSTOPERATIVE HYPOTHYROIDISM: Primary | ICD-10-CM

## 2023-08-03 DIAGNOSIS — E89.0 POSTOPERATIVE HYPOTHYROIDISM: ICD-10-CM

## 2023-08-03 DIAGNOSIS — Z00.00 MEDICARE ANNUAL WELLNESS VISIT, SUBSEQUENT: ICD-10-CM

## 2023-08-03 DIAGNOSIS — Z13.1 SCREENING FOR DIABETES MELLITUS: ICD-10-CM

## 2023-08-03 DIAGNOSIS — I10 ESSENTIAL HYPERTENSION: ICD-10-CM

## 2023-08-03 DIAGNOSIS — E78.2 MIXED HYPERLIPIDEMIA: ICD-10-CM

## 2023-08-03 DIAGNOSIS — R79.9 ABNORMAL FINDING OF BLOOD CHEMISTRY, UNSPECIFIED: ICD-10-CM

## 2023-08-03 PROBLEM — E21.3 HYPERPARATHYROIDISM: Status: RESOLVED | Noted: 2023-06-08 | Resolved: 2023-08-03

## 2023-08-03 LAB — TSH SERPL-ACNC: 2.16 UIU/ML (ref 0.35–4.94)

## 2023-08-03 PROCEDURE — 99214 OFFICE O/P EST MOD 30 MIN: CPT | Mod: ,,, | Performed by: FAMILY MEDICINE

## 2023-08-03 PROCEDURE — 36415 COLL VENOUS BLD VENIPUNCTURE: CPT

## 2023-08-03 PROCEDURE — 99214 PR OFFICE/OUTPT VISIT, EST, LEVL IV, 30-39 MIN: ICD-10-PCS | Mod: ,,, | Performed by: FAMILY MEDICINE

## 2023-08-03 PROCEDURE — 84443 ASSAY THYROID STIM HORMONE: CPT

## 2023-08-03 RX ORDER — BUSPIRONE HYDROCHLORIDE 7.5 MG/1
7.5 TABLET ORAL 3 TIMES DAILY
COMMUNITY

## 2023-08-03 NOTE — PROGRESS NOTES
Alondra Vigil  08/03/2023  [id number]    Krystal Sena MD  Patient Care Team:  Krystal Sena MD as PCP - General (Family Medicine)  Sheldon Richards MD as Consulting Physician (Cardiology)  Mary Anne Benedict LPN as Care Coordinator      Chief Complaint:  Chief Complaint   Patient presents with    Follow-up     Pt would like her Thyroid problems to be handled by our office       History of Present Illness:  HPI    68 y.o. female who presents today asking for hypothyroidism to be managed by me. She has been seeing an endocrinologist in [place] and it has become too difficult of a drive for her. She had a partial thyroidectomy in 1996 for benign nodule. Her tsh has recently increased and she has been feeling very fatigued, having muscle aches and difficulty losing weight in addition to brain fog. She states she feels better with lower TSH levels. I will repeat a TSH and adjust armour thyroid based on her level. She does have an appt with DR. Carroll in October.     Review of Systems  General: denies f/c, weight loss, night sweats, decreased appetite  Eye: denies blurred vision, changes in vision  Respiratory: denies sob, wheezing, cough  Cardiovascular: denies chest pain, palpitations, edema  Gastrointestinal: denies abdominal pain, n/v, constipation, diarrhea  Integumentary: denies rashes, pruritis        Health Maintenance  Health Maintenance Topics with due status: Not Due       Topic Last Completion Date    Colorectal Cancer Screening 09/28/2021    DEXA Scan 12/28/2022    Hemoglobin A1c (Diabetic Prevention Screening) 03/31/2023    High Dose Statin 04/25/2023    Lipid Panel 06/06/2023    Influenza Vaccine Not Due     Health Maintenance Due   Topic Date Due    TETANUS VACCINE  Never done    Mammogram  09/13/2023       Exam:  Vitals:    08/03/23 1357   BP: 130/80   BP Location: Left arm   Patient Position: Sitting   BP Method: Large (Automatic)   Pulse: (!) 59   Resp: 20   Temp: 97.9 °F  "(36.6 °C)   TempSrc: Oral   SpO2: 98%   Weight: 75.4 kg (166 lb 4.8 oz)   Height: 5' 2" (1.575 m)     Weight: 75.4 kg (166 lb 4.8 oz)   Body mass index is 30.42 kg/m².      Physical Exam  Constitutional: NAD, alert, pleasant  Respiratory: No accessory muscle use, no cough or audible wheezing  Eyes: EOMI, no conjunctivitis   Integumentary: warm, dry, intact  Psych: AA&Ox3, answers questions appropriately        ICD-10-CM ICD-9-CM   1. Postoperative hypothyroidism  E89.0 244.0   2. Medicare annual wellness visit, subsequent  Z00.00 V70.0   3. Essential hypertension  I10 401.9   4. Screening for diabetes mellitus  Z13.1 V77.1   5. Encounter for lipid screening for cardiovascular disease  Z13.220 V77.91    Z13.6 V81.2   6. Abnormal finding of blood chemistry, unspecified  R79.9 790.6   7. Mixed hyperlipidemia  E78.2 272.2       1. Postoperative hypothyroidism  Overview:  S/p partial thyroidectomy for benign nodule.       TSH ordered. Will adjust armour thyroid based on tsh and patient has f/u with Dr. Carroll in October. If he releases patient to me, I will manage hypothyroidism    Orders:  -     TSH; Future; Expected date: 08/03/2023  -     TSH; Future; Expected date: 03/03/2024    2. Medicare annual wellness visit, subsequent    3. Essential hypertension  Overview:  At goal on current meds. Sees Dr. Richards    Orders:  -     CBC Auto Differential; Future; Expected date: 03/03/2024  -     Comprehensive Metabolic Panel; Future; Expected date: 03/03/2024  -     Urinalysis; Future; Expected date: 03/03/2024    4. Screening for diabetes mellitus  -     Hemoglobin A1C; Future; Expected date: 03/03/2024    5. Encounter for lipid screening for cardiovascular disease  -     Lipid Panel; Future; Expected date: 03/03/2024    6. Abnormal finding of blood chemistry, unspecified  -     Hemoglobin A1C; Future; Expected date: 03/03/2024    7. Mixed hyperlipidemia  -     Lipid Panel; Future; Expected date: 03/03/2024         Follow " up: Follow up if symptoms worsen or fail to improve.      Care Plan/Goals: Reviewed   Goals    None

## 2023-08-11 ENCOUNTER — CLINICAL SUPPORT (OUTPATIENT)
Dept: UROGYNECOLOGY | Facility: CLINIC | Age: 68
End: 2023-08-11
Payer: MEDICARE

## 2023-08-11 DIAGNOSIS — N39.3 STRESS INCONTINENCE: Primary | ICD-10-CM

## 2023-08-14 ENCOUNTER — OFFICE VISIT (OUTPATIENT)
Dept: UROGYNECOLOGY | Facility: CLINIC | Age: 68
End: 2023-08-14
Payer: MEDICARE

## 2023-08-14 VITALS
WEIGHT: 160 LBS | HEIGHT: 62 IN | SYSTOLIC BLOOD PRESSURE: 157 MMHG | HEART RATE: 78 BPM | BODY MASS INDEX: 29.44 KG/M2 | DIASTOLIC BLOOD PRESSURE: 72 MMHG

## 2023-08-14 DIAGNOSIS — R32 URINARY INCONTINENCE, UNSPECIFIED TYPE: Primary | ICD-10-CM

## 2023-08-14 DIAGNOSIS — N81.9 FEMALE GENITAL PROLAPSE, UNSPECIFIED TYPE: ICD-10-CM

## 2023-08-14 DIAGNOSIS — N95.8 GENITOURINARY SYNDROME OF MENOPAUSE: ICD-10-CM

## 2023-08-14 DIAGNOSIS — N39.3 STRESS INCONTINENCE: ICD-10-CM

## 2023-08-14 DIAGNOSIS — N39.41 URGE INCONTINENCE: ICD-10-CM

## 2023-08-14 LAB
APPEARANCE UR: CLEAR
BACTERIA #/AREA URNS AUTO: NORMAL /HPF
BILIRUB UR QL STRIP.AUTO: NEGATIVE
COLOR UR: YELLOW
GLUCOSE UR QL STRIP.AUTO: NEGATIVE
KETONES UR QL STRIP.AUTO: NEGATIVE
LEUKOCYTE ESTERASE UR QL STRIP.AUTO: NEGATIVE
NITRITE UR QL STRIP.AUTO: NEGATIVE
PH UR STRIP.AUTO: 6.5 [PH]
PROT UR QL STRIP.AUTO: NEGATIVE
RBC #/AREA URNS AUTO: <5 /HPF
RBC UR QL AUTO: NEGATIVE
SP GR UR STRIP.AUTO: 1.01 (ref 1–1.03)
SQUAMOUS #/AREA URNS AUTO: <5 /HPF
UROBILINOGEN UR STRIP-ACNC: 0.2
WBC #/AREA URNS AUTO: <5 /HPF

## 2023-08-14 PROCEDURE — 99205 PR OFFICE/OUTPT VISIT, NEW, LEVL V, 60-74 MIN: ICD-10-PCS | Mod: S$GLB,,, | Performed by: OBSTETRICS & GYNECOLOGY

## 2023-08-14 PROCEDURE — 81001 URINALYSIS AUTO W/SCOPE: CPT | Performed by: FAMILY MEDICINE

## 2023-08-14 PROCEDURE — 51798 US URINE CAPACITY MEASURE: CPT | Mod: S$GLB,,, | Performed by: OBSTETRICS & GYNECOLOGY

## 2023-08-14 PROCEDURE — 99205 OFFICE O/P NEW HI 60 MIN: CPT | Mod: S$GLB,,, | Performed by: OBSTETRICS & GYNECOLOGY

## 2023-08-14 PROCEDURE — 51798 PR MEAS,POST-VOID RES,US,NON-IMAGING: ICD-10-PCS | Mod: S$GLB,,, | Performed by: OBSTETRICS & GYNECOLOGY

## 2023-08-14 RX ORDER — ESTRADIOL 0.1 MG/G
CREAM VAGINAL
Qty: 42.5 G | Refills: 10 | Status: SHIPPED | OUTPATIENT
Start: 2023-08-14 | End: 2024-03-27

## 2023-08-18 DIAGNOSIS — N32.81 OAB (OVERACTIVE BLADDER): Primary | ICD-10-CM

## 2023-08-18 RX ORDER — TROSPIUM CHLORIDE 20 MG/1
20 TABLET, FILM COATED ORAL 2 TIMES DAILY
Qty: 180 TABLET | Refills: 3 | Status: SHIPPED | OUTPATIENT
Start: 2023-08-18 | End: 2024-03-27

## 2023-08-24 NOTE — PROGRESS NOTES
PELVIC MEDICINE AND RECONSTRUCTIVE SURGERY CONSULT NOTE    CHIEF COMPLAINT:  Consultation requested by  regarding incontinence    HISTORY OF PRESENT ILLNESS:   Alondra Vigil is a 68 y.o. female  Patient reports first noticing symptoms 3 years.  She states she had long COVID and leaked every time she coughed. If she lies down, she leaks when she gets up.  H/o CVA in 2021 with heart and lung issues  Kidney function 40% per patient    Voids during the day: q3-4h  Voids at nighttime: q2-3h  Leakage of urine with coughing or exercise: Yes - 3 years  -previous surgery? No  Leakage of urine with urgency: Yes - has less than 1 min  Pad for leakage of urine:yes   -how often do you change your pad?  2 times  Sensation of incomplete emptying: No  Splinting to void/BM: No  History of kidney stones No  Hematuria Yes  > 3 UTIs in 12 months  No  - symptoms with UTI? N/A  Stream: Normal    Glasses/ounces of water in 1 day: 80oz  Cups of coffee/tea/soda in 1 day: 1 coffee  Bowel movements in 1 week: 7  Constipation/straining: No  Fecal incontinence: No  Fecal urgency: Yes - on probiotic and pantoprazole  Fecal smearing: Yes - has issues with diarrhea    Patient  denies symptoms of a vaginal bulge.  Size of the bulge: N/A  Bulge becoming progressively worse over time: not applicable  Bulge limits physical activity: not applicable    Tried a pessary: No  Tried Kegels: No  Prior Surgery for prolapse: No  Prior Surgery for incontinence: No    Sexually active: No  Pain with sex: no  Vaginal dryness: Yes  Loss of urine or stool with sexual activity:not applicable    She denies vaginal bleeding, vaginal discharge, dysuria, hematuria,   Patient also denies abdominal or pelvic pain.    Patient reports back injury or back pain  Patient denies lower extremity numbness, tingling      Gyn Hx:  Patient reports h/o Normal paps; reports h/o Fibroids, reports h/o Endometriosis, denies h/o Ovarian Cysts, denies h/o abnormal  paps, denies h/o LEEP/Cryo  Menopause age: 1997  No LMP recorded (lmp unknown). Patient has had a hysterectomy.   V4  Obstetric complications? no  Vaginal births? yes  -use of forceps or vacuum? no    OB History    Para Term  AB Living   5 4 4 0 1 4   SAB IAB Ectopic Multiple Live Births   1 0 0 0 4      # Outcome Date GA Lbr Juan Miguel/2nd Weight Sex Delivery Anes PTL Lv   5 Term    3.447 kg (7 lb 9.6 oz) F Vag-Spont   ANTWON   4 Term    3.674 kg (8 lb 1.6 oz) M Vag-Spont   ANTWON   3 Term    3.674 kg (8 lb 1.6 oz) F Vag-Spont   ANTWON   2 Term    3.175 kg (7 lb) M Vag-Spont   ANTWON   1 SAB                 Review of patient's allergies indicates:   Allergen Reactions    Iodine Hives    Penicillins Hives     Tolerating  Zosyn and Augmentin      Sulfa (sulfonamide antibiotics) Hives    Amlodipine     Aspirin Other (See Comments)     bleeding  Other reaction(s): Bleeding (finding)  Von Willebrand Disease  Von Willebrand Disease  Von Willebrand Disease      Digoxin     Lidocaine     Phenylephrine Hives    Phenylephrine hcl Hives    Procaine Other (See Comments)    Triamterene     Valacyclovir Other (See Comments)     unknown    Prednisone Palpitations and Other (See Comments)     Other reaction(s): Polyvinyl chloride (substance), Premature atrial contraction (disorder), Tachycardia (finding)            Current Outpatient Medications:     albuterol (PROVENTIL/VENTOLIN HFA) 90 mcg/actuation inhaler, albuterol sulfate HFA 90 mcg/actuation aerosol inhaler, Disp: , Rfl:     albuterol-ipratropium (DUO-NEB) 2.5 mg-0.5 mg/3 mL nebulizer solution, Take 3 mLs by nebulization every 6 (six) hours while awake. Rescue, Disp: 75 mL, Rfl: 0    amLODIPine (NORVASC) 5 MG tablet, Take 5 mg by mouth Daily., Disp: , Rfl:     ARMOUR THYROID 15 mg Tab, Take 15 mg by mouth once daily., Disp: , Rfl:     ascorbic acid, vitamin C, (VITAMIN C) 1000 MG tablet, Take 1 tablet by mouth every morning., Disp: , Rfl:      atorvastatin (LIPITOR) 20 MG tablet, atorvastatin 20 mg tablet  TAKE ONE TABLET BY MOUTH AT BEDTIME, Disp: , Rfl:     budesonide (PULMICORT) 0.5 mg/2 mL nebulizer solution, Inhale 0.5 mg into the lungs., Disp: , Rfl:     busPIRone (BUSPAR) 7.5 MG tablet, Take 7.5 mg by mouth 3 (three) times daily., Disp: , Rfl:     estradioL (ESTRACE) 0.01 % (0.1 mg/gram) vaginal cream, Use a pea sized amount to the vagina once a night for 14 nights when initiating the medication, then 2-3 times per week.  DO NOT use the applicator., Disp: 42.5 g, Rfl: 10    metoprolol succinate (TOPROL-XL) 50 MG 24 hr tablet, Take 25 mg by mouth once daily., Disp: , Rfl:     metoprolol succinate 25 mg CSpX, Take 1 tablet by mouth Daily., Disp: , Rfl:     montelukast (SINGULAIR) 10 mg tablet, Take 10 mg by mouth Daily., Disp: , Rfl:     pantoprazole (PROTONIX) 40 MG tablet, Take 40 mg by mouth 2 (two) times a day. 30 minutes after breakfast and dinner, Disp: , Rfl:     promethazine-codeine 6.25-10 mg/5 ml (PHENERGAN WITH CODEINE) 6.25-10 mg/5 mL syrup, promethazine 6.25 mg-codeine 10 mg/5 mL syrup  TAKE 5ML BY MOUTH EVERY 4 TO 6 HOURS AS NEEDED FOR COUGH, Disp: , Rfl:     spironolactone (ALDACTONE) 25 MG tablet, Take 25 mg by mouth as needed. Taking three times a week, Disp: , Rfl:     trospium (SANCTURA) 20 mg Tab tablet, Take 1 tablet (20 mg total) by mouth 2 (two) times daily., Disp: 180 tablet, Rfl: 3    vibegron 75 mg Tab, Take 75 mg by mouth once daily., Disp: 30 tablet, Rfl: 5    zinc sulfate (ZINCATE) 50 mg zinc (220 mg) capsule, Take 1 capsule by mouth every morning., Disp: , Rfl:     Current Facility-Administered Medications:     mupirocin 2 % ointment, , Topical (Top), 1 time in Clinic/HOD, Krystal Sena MD    Past Medical History:   Diagnosis Date    Acute ischemic cerebrovascular accident (CVA) involving left middle cerebral artery territory 03/02/2022    Anemia     Anxiety disorder, unspecified     Chronic systolic CHF  "(congestive heart failure)     CKD (chronic kidney disease) stage 3, GFR 30-59 ml/min     Clotting disorder     Essential (primary) hypertension     Paroxysmal atrial fibrillation     Postsurgical hypothyroidism     Von Willebrand disease        Past Surgical History:   Procedure Laterality Date    APPENDECTOMY      CHOLECYSTECTOMY      GASTROSTOMY TUBE PLACEMENT  03/02/2022    HYSTERECTOMY      OOPHORECTOMY Bilateral     THYROIDECTOMY, PARTIAL      TONSILLECTOMY      TOTAL ABDOMINAL HYSTERECTOMY W/ BILATERAL SALPINGOOPHORECTOMY      TRACHEOSTOMY  03/02/2022       Family History   Problem Relation Age of Onset    Kidney disease Mother     Kidney disease Father         Social History     Tobacco Use    Smoking status: Former     Current packs/day: 1.00     Average packs/day: 1 pack/day for 21.0 years (21.0 ttl pk-yrs)     Types: Cigarettes     Passive exposure: Past    Smokeless tobacco: Never    Tobacco comments:     Pt quit smoking 25 plus years ago.   Substance Use Topics    Alcohol use: Not Currently     Comment: socially    Drug use: Never        Review of Systems   Psychological ROS: negative  Eyes: Negative  ENT ROS: Negative  Neuro ROS: Difficulty speaking and Unsteady gait  Respiratory ROS: Persistent cough  Gastrointestinal ROS: Negative  Cardiovascular ROS: Negative  Genitourinary ROS: Leaking of urine and Waking to urinate  Integumentary ROS: Moles and skin changes  Musculoskeletal ROS: Joint pain/stiffness/swelling    Physical Exam:   BP (!) 157/72   Pulse 78   Ht 5' 2" (1.575 m)   Wt 72.6 kg (160 lb)   LMP  (LMP Unknown)   BMI 29.26 kg/m²   General: No acute distress   Skin: no lesions  Musculoskeletal: normal lower extremity strength  Sensation to light touch in the lower extremities is normal   Extremities: well perfused with normal pulses in the distal extremities, no peripheral edema noted  Abdominal exam: soft non tender, no palpable masses, several scars        External genitalia: normal " female genitalia, no lesions, normal female hair distribution, no clitoral enlargement, nontender, no scars  Sensation S2-S4 is present  The perineal reflexes are present  Exam Chaperoned by: Dinora Baker MA  Vagina/cervix: atrophic vagina, no discharge, exudate, lesion, or erythema  Bimanual exam: Surgically absent, no palpable masses, non-tender exam  Urethra: no prolapse, caruncle, absent  Rectal: Medium tone, Poor squeeze, no palpable masses, Soft stool in the vault, No blood on the glove  Stress test: negative  Urethral hypermobility: absent  Pelvic strength: 1/5  Post void residual volume: 27 mL via U/S    POPQ (Date 2023): Aa -0.5 Ba -0.5  C -4.5  GH 4 PB 1.5 TVL 7.5 Ap -3 Bp -3 D N/A        ASSESSMENT:  Alondra Vigil is a 68 y.o.    1. Urinary incontinence, unspecified type    2. Urge incontinence    3. Stress incontinence    4. Female genital prolapse, unspecified type    5. Genitourinary syndrome of menopause          PLAN:  The pathophysiology of the above condition has been discussed with the patient who expressed understanding.   We discussed the differences between stress and urge incontinence.    Urge urinary incontinence - We reviewed management options for Urge Incontinence including expectant management, lifestyle and behavioral modifications, Kegel's, PFPT, medications and more invasive treatments such as PTNS, Botox, and Interstim. We reviewed in great detail the success and failure rate associated with each approach. For Botox we discussed complication rates such as voiding dysfunction, need for self catheterization and, more rare complications of muscle weakness remote from Botox injection. Will send in script for Gemtesa.  Patient is over age 65, has CKD and has issues HTN therefore she cannot use Myrbetriq.      Stress urinary incontinence - We reviewed management options for Stress Incontinence including expectant management, lifestyle and behavioral modifications, Kegel's,  PFPT, Poise incontinence inserts, pessaries, and more invasive treatments such as urethral bulking, mid-urethral and fascial slings. We reviewed in great detail the success and failure rate associated with each approach. For slings, we discussed complication rates like mesh erosion, post op urinary retention, voiding dysfunction, pain, infection, bleeding and the expected recovery after a sling procedure. She did not leak during exam today.  We explained the need for urodynamic testing to better clarify the nature of her voiding complaints.  If she leaks, would recommend Bulking    Pelvic Organ Prolapse - the patient was counseled regarding the pathophysiology of prolapse.  The patient was counseled regarding the possible natural progression of prolapse and the clinical consequences of worsening prolapse.  We discussed that in most cases prolapse is not harmful but can negatively affect her quality of life.  She was counseled regarding management strategies including: expectant management, pelvic floor physical therapy, weight loss, Kegel's, avoidance of constipation and heavy lifting, and pessary placement.  Surgical management also reviewed.  Cure from surgical intervention is not guaranteed and there is risk of occult stress incontinence.  She verbalized understanding and desires to pursue conservative management at this time.  Her main concern is the incontinence     Genitourinary syndrome of menopause/Vaginal atrophy - due to a hypoestrogenic state in the vagina. Though it is most common in postmenopausal women (up to 50-70%  of postmenopausal women being symptomatic at least to some degree), it can affect up to 15% of premenopausal women was well.  GSM is a spectrum of changes such as vaginal dryness, dyspareunia, irritation, burning, itching, dysuria, urgency, stress/urgency incontinence, recurrent UTIs, urethral prolapse, decreased elasticity, and ischemia of the vesical trigone. Encouraged use of vaginal  estrogen as it will restore some elasticity to the vagina and has been shown to decrease the rate of recurrent UTIs, decrease vaginal pH, and increase vaginal lactobacillus.  A pea-sized amount to the vagina every night for 14 days, then 2-3 times per week.  Handout given on low dose vaginal estrogen.      At the time the patient desires to proceed:  Gemtesa  UDT for mixed incontinence  Vaginal estrogen    Handouts provided on above diagnosis    Follow up with UDT    Orders Placed This Encounter    Urine culture    Urinalysis, Microscopic    vibegron 75 mg Tab    estradioL (ESTRACE) 0.01 % (0.1 mg/gram) vaginal cream       ALIS Rubio MD  Pelvic Medicine and Reconstructive Surgery  Urogynecology  [facility]    70 minutes was spent face to face with patient >50% of the time was spent in counseling and coordination of care.

## 2023-09-14 ENCOUNTER — LAB VISIT (OUTPATIENT)
Dept: LAB | Facility: HOSPITAL | Age: 68
End: 2023-09-14
Attending: NURSE PRACTITIONER
Payer: MEDICARE

## 2023-09-14 DIAGNOSIS — R19.7 DIARRHEA: ICD-10-CM

## 2023-09-14 DIAGNOSIS — I48.91 ATRIAL FIBRILLATION: Primary | ICD-10-CM

## 2023-09-14 LAB
ALBUMIN SERPL-MCNC: 4 G/DL (ref 3.4–4.8)
ALBUMIN/GLOB SERPL: 1.6 RATIO (ref 1.1–2)
ALP SERPL-CCNC: 80 UNIT/L (ref 40–150)
ALT SERPL-CCNC: 21 UNIT/L (ref 0–55)
AST SERPL-CCNC: 18 UNIT/L (ref 5–34)
BASOPHILS # BLD AUTO: 0.04 X10(3)/MCL
BASOPHILS NFR BLD AUTO: 0.6 %
BCS RECOMMENDATION EXT: NORMAL
BILIRUB SERPL-MCNC: 0.4 MG/DL
BUN SERPL-MCNC: 23.6 MG/DL (ref 9.8–20.1)
CALCIUM SERPL-MCNC: 9.6 MG/DL (ref 8.4–10.2)
CHLORIDE SERPL-SCNC: 107 MMOL/L (ref 98–107)
CO2 SERPL-SCNC: 26 MMOL/L (ref 23–31)
CREAT SERPL-MCNC: 1.2 MG/DL (ref 0.55–1.02)
EOSINOPHIL # BLD AUTO: 0.13 X10(3)/MCL (ref 0–0.9)
EOSINOPHIL NFR BLD AUTO: 2 %
ERYTHROCYTE [DISTWIDTH] IN BLOOD BY AUTOMATED COUNT: 12.1 % (ref 11.5–17)
GFR SERPLBLD CREATININE-BSD FMLA CKD-EPI: 49 MLS/MIN/1.73/M2
GLOBULIN SER-MCNC: 2.5 GM/DL (ref 2.4–3.5)
GLUCOSE SERPL-MCNC: 106 MG/DL (ref 82–115)
HCT VFR BLD AUTO: 39.1 % (ref 37–47)
HGB BLD-MCNC: 13 G/DL (ref 12–16)
IMM GRANULOCYTES # BLD AUTO: 0.01 X10(3)/MCL (ref 0–0.04)
IMM GRANULOCYTES NFR BLD AUTO: 0.2 %
IRON SATN MFR SERPL: 38 % (ref 20–50)
IRON SERPL-MCNC: 104 UG/DL (ref 50–170)
LYMPHOCYTES # BLD AUTO: 1.96 X10(3)/MCL (ref 0.6–4.6)
LYMPHOCYTES NFR BLD AUTO: 30.7 %
MAGNESIUM SERPL-MCNC: 2.5 MG/DL (ref 1.6–2.6)
MCH RBC QN AUTO: 31.6 PG (ref 27–31)
MCHC RBC AUTO-ENTMCNC: 33.2 G/DL (ref 33–36)
MCV RBC AUTO: 95.1 FL (ref 80–94)
MONOCYTES # BLD AUTO: 0.5 X10(3)/MCL (ref 0.1–1.3)
MONOCYTES NFR BLD AUTO: 7.8 %
NEUTROPHILS # BLD AUTO: 3.74 X10(3)/MCL (ref 2.1–9.2)
NEUTROPHILS NFR BLD AUTO: 58.7 %
NRBC BLD AUTO-RTO: 0 %
PLATELET # BLD AUTO: 282 X10(3)/MCL (ref 130–400)
PMV BLD AUTO: 9.3 FL (ref 7.4–10.4)
POTASSIUM SERPL-SCNC: 4.4 MMOL/L (ref 3.5–5.1)
PROT SERPL-MCNC: 6.5 GM/DL (ref 5.8–7.6)
RBC # BLD AUTO: 4.11 X10(6)/MCL (ref 4.2–5.4)
SODIUM SERPL-SCNC: 141 MMOL/L (ref 136–145)
TIBC SERPL-MCNC: 167 UG/DL (ref 70–310)
TIBC SERPL-MCNC: 271 UG/DL (ref 250–450)
TRANSFERRIN SERPL-MCNC: 220 MG/DL (ref 173–360)
WBC # SPEC AUTO: 6.38 X10(3)/MCL (ref 4.5–11.5)

## 2023-09-14 PROCEDURE — 85025 COMPLETE CBC W/AUTO DIFF WBC: CPT

## 2023-09-14 PROCEDURE — 80053 COMPREHEN METABOLIC PANEL: CPT

## 2023-09-14 PROCEDURE — 83540 ASSAY OF IRON: CPT

## 2023-09-14 PROCEDURE — 83735 ASSAY OF MAGNESIUM: CPT

## 2023-09-14 PROCEDURE — 36415 COLL VENOUS BLD VENIPUNCTURE: CPT

## 2023-10-18 DIAGNOSIS — J84.10 FIBROSIS OF LUNG: ICD-10-CM

## 2023-10-18 DIAGNOSIS — R06.02 SHORTNESS OF BREATH: ICD-10-CM

## 2023-10-18 RX ORDER — IPRATROPIUM BROMIDE AND ALBUTEROL SULFATE 2.5; .5 MG/3ML; MG/3ML
3 SOLUTION RESPIRATORY (INHALATION)
Qty: 75 ML | Refills: 3 | Status: SHIPPED | OUTPATIENT
Start: 2023-10-18

## 2023-10-18 NOTE — TELEPHONE ENCOUNTER
Pt notified that the duoneb was sent to the pharmacy and we discussed cleaning the nebulizer machine. Verbal understanding was given.

## 2023-10-18 NOTE — TELEPHONE ENCOUNTER
----- Message from Laura Senegal sent at 10/18/2023  9:55 AM CDT -----  Regarding: refill  .Type:  RX Refill Request    Who Called:  pt    Refill or New Rx: refill    RX Name and Strength: albuterol-ipratropium (DUO-NEB) 2.5 mg-0.5 mg/3 mL nebulizer solution    How is the patient currently taking it? (ex. 1XDay):    Is this a 30 day or 90 day RX: 30    Preferred Pharmacy with phone number: [facility]    Local or Mail Order: local    Ordering Provider: Krystal     Would the patient rather a call back? Yes    Best Call Back Number: [phone]    Additional Information:  refill pt also wants the  that cleans the inside of the inhaler she says it has to come from the doctor thanks

## 2023-10-19 ENCOUNTER — TELEPHONE (OUTPATIENT)
Dept: FAMILY MEDICINE | Facility: CLINIC | Age: 68
End: 2023-10-19
Payer: MEDICARE

## 2023-10-19 NOTE — TELEPHONE ENCOUNTER
Spoke to pt and advised her that a PA was already done and that her insurance denied it. She states that the she will go ahead any pay for the medication. It is $50.

## 2023-10-19 NOTE — TELEPHONE ENCOUNTER
----- Message from Hudson Chatterjee sent at 10/19/2023  9:37 AM CDT -----  Regarding: call back  .Type:  Needs Medical Advice    Who Called: pablo  Would the patient rather a call back or a response via [facility]? suzy  Best Call Back Number: [phone]  Additional Information: pt is requesting a call back from the nurse regarding albuterol-ipratropium (DUO-NEB) 2.5 mg-0.5 mg/3 mL nebulizer solution she states she needs a pa

## 2023-12-06 ENCOUNTER — LAB VISIT (OUTPATIENT)
Dept: LAB | Facility: HOSPITAL | Age: 68
End: 2023-12-06
Attending: INTERNAL MEDICINE
Payer: MEDICARE

## 2023-12-06 DIAGNOSIS — E87.8 DILATED CARDIOMYOPATHY SECONDARY TO ELECTROLYTE DEFICIENCY: ICD-10-CM

## 2023-12-06 DIAGNOSIS — I51.9 MYXEDEMA HEART DISEASE: ICD-10-CM

## 2023-12-06 DIAGNOSIS — I12.9 PARENCHYMAL RENAL HYPERTENSION: ICD-10-CM

## 2023-12-06 DIAGNOSIS — I43 DILATED CARDIOMYOPATHY SECONDARY TO ELECTROLYTE DEFICIENCY: ICD-10-CM

## 2023-12-06 DIAGNOSIS — I50.9 HEART FAILURE, UNSPECIFIED HF CHRONICITY, UNSPECIFIED HEART FAILURE TYPE: ICD-10-CM

## 2023-12-06 DIAGNOSIS — N18.32 CHRONIC KIDNEY DISEASE (CKD) STAGE G3B/A1, MODERATELY DECREASED GLOMERULAR FILTRATION RATE (GFR) BETWEEN 30-44 ML/MIN/1.73 SQUARE METER AND ALBUMINURIA CREATININE RATIO LESS THAN 30 MG/G: ICD-10-CM

## 2023-12-06 DIAGNOSIS — D63.1 ANEMIA OF CHRONIC RENAL FAILURE, UNSPECIFIED CKD STAGE: Primary | ICD-10-CM

## 2023-12-06 DIAGNOSIS — E03.9 MYXEDEMA HEART DISEASE: ICD-10-CM

## 2023-12-06 DIAGNOSIS — N18.9 ANEMIA OF CHRONIC RENAL FAILURE, UNSPECIFIED CKD STAGE: Primary | ICD-10-CM

## 2023-12-06 LAB
ALBUMIN SERPL-MCNC: 3.9 G/DL (ref 3.4–4.8)
ALBUMIN/GLOB SERPL: 1.5 RATIO (ref 1.1–2)
ALP SERPL-CCNC: 74 UNIT/L (ref 40–150)
ALT SERPL-CCNC: 21 UNIT/L (ref 0–55)
APPEARANCE UR: CLEAR
AST SERPL-CCNC: 18 UNIT/L (ref 5–34)
BACTERIA #/AREA URNS AUTO: ABNORMAL /HPF
BILIRUB SERPL-MCNC: 0.3 MG/DL
BILIRUB UR QL STRIP.AUTO: NEGATIVE
BNP BLD-MCNC: 50.1 PG/ML
BUN SERPL-MCNC: 17.9 MG/DL (ref 9.8–20.1)
CALCIUM SERPL-MCNC: 9.8 MG/DL (ref 8.4–10.2)
CHLORIDE SERPL-SCNC: 106 MMOL/L (ref 98–107)
CO2 SERPL-SCNC: 29 MMOL/L (ref 23–31)
COLOR UR AUTO: ABNORMAL
CREAT SERPL-MCNC: 1.18 MG/DL (ref 0.55–1.02)
CREAT UR-MCNC: 123 MG/DL (ref 45–106)
ERYTHROCYTE [DISTWIDTH] IN BLOOD BY AUTOMATED COUNT: 12.4 % (ref 11.5–17)
GFR SERPLBLD CREATININE-BSD FMLA CKD-EPI: 50 MLS/MIN/1.73/M2
GLOBULIN SER-MCNC: 2.6 GM/DL (ref 2.4–3.5)
GLUCOSE SERPL-MCNC: 104 MG/DL (ref 82–115)
GLUCOSE UR QL STRIP.AUTO: NORMAL
HCT VFR BLD AUTO: 38.3 % (ref 37–47)
HGB BLD-MCNC: 12.6 G/DL (ref 12–16)
KETONES UR QL STRIP.AUTO: NEGATIVE
LEUKOCYTE ESTERASE UR QL STRIP.AUTO: 250
MAGNESIUM SERPL-MCNC: 2.1 MG/DL (ref 1.6–2.6)
MCH RBC QN AUTO: 31.7 PG (ref 27–31)
MCHC RBC AUTO-ENTMCNC: 32.9 G/DL (ref 33–36)
MCV RBC AUTO: 96.2 FL (ref 80–94)
MUCOUS THREADS URNS QL MICRO: ABNORMAL /LPF
NITRITE UR QL STRIP.AUTO: NEGATIVE
NRBC BLD AUTO-RTO: 0 %
PH UR STRIP.AUTO: 6.5 [PH]
PLATELET # BLD AUTO: 282 X10(3)/MCL (ref 130–400)
PMV BLD AUTO: 9 FL (ref 7.4–10.4)
POTASSIUM SERPL-SCNC: 4.2 MMOL/L (ref 3.5–5.1)
PROT SERPL-MCNC: 6.5 GM/DL (ref 5.8–7.6)
PROT UR QL STRIP.AUTO: NEGATIVE
PROT UR STRIP-MCNC: <6.8 MG/DL
PTH-INTACT SERPL-MCNC: 57.7 PG/ML (ref 8.7–77)
RBC # BLD AUTO: 3.98 X10(6)/MCL (ref 4.2–5.4)
RBC #/AREA URNS AUTO: ABNORMAL /HPF
RBC UR QL AUTO: NEGATIVE
SODIUM SERPL-SCNC: 142 MMOL/L (ref 136–145)
SP GR UR STRIP.AUTO: 1.02 (ref 1–1.03)
SQUAMOUS #/AREA URNS LPF: ABNORMAL /HPF
URATE SERPL-MCNC: 6.3 MG/DL (ref 2.6–6)
UROBILINOGEN UR STRIP-ACNC: NORMAL
WBC # SPEC AUTO: 6.36 X10(3)/MCL (ref 4.5–11.5)
WBC #/AREA URNS AUTO: ABNORMAL /HPF

## 2023-12-06 PROCEDURE — 83880 ASSAY OF NATRIURETIC PEPTIDE: CPT

## 2023-12-06 PROCEDURE — 81001 URINALYSIS AUTO W/SCOPE: CPT

## 2023-12-06 PROCEDURE — 80053 COMPREHEN METABOLIC PANEL: CPT

## 2023-12-06 PROCEDURE — 83970 ASSAY OF PARATHORMONE: CPT

## 2023-12-06 PROCEDURE — 36415 COLL VENOUS BLD VENIPUNCTURE: CPT

## 2023-12-06 PROCEDURE — 85027 COMPLETE CBC AUTOMATED: CPT

## 2023-12-06 PROCEDURE — 83735 ASSAY OF MAGNESIUM: CPT

## 2023-12-06 PROCEDURE — 84550 ASSAY OF BLOOD/URIC ACID: CPT

## 2023-12-06 PROCEDURE — 82570 ASSAY OF URINE CREATININE: CPT

## 2023-12-11 DIAGNOSIS — N18.31 STAGE 3A CHRONIC KIDNEY DISEASE: Primary | ICD-10-CM

## 2023-12-12 ENCOUNTER — HOSPITAL ENCOUNTER (OUTPATIENT)
Dept: RADIOLOGY | Facility: HOSPITAL | Age: 68
Discharge: HOME | End: 2023-12-12
Attending: INTERNAL MEDICINE
Payer: MEDICARE

## 2023-12-12 DIAGNOSIS — N18.31 STAGE 3A CHRONIC KIDNEY DISEASE: ICD-10-CM

## 2023-12-12 PROCEDURE — 76770 US EXAM ABDO BACK WALL COMP: CPT | Mod: TC

## 2024-01-04 PROBLEM — D69.1: Status: ACTIVE | Noted: 2024-01-04

## 2024-01-11 ENCOUNTER — TELEPHONE (OUTPATIENT)
Dept: UROGYNECOLOGY | Facility: CLINIC | Age: 69
End: 2024-01-11
Payer: MEDICARE

## 2024-03-25 ENCOUNTER — TELEPHONE (OUTPATIENT)
Dept: FAMILY MEDICINE | Facility: CLINIC | Age: 69
End: 2024-03-25
Payer: MEDICARE

## 2024-03-25 NOTE — TELEPHONE ENCOUNTER
----- Message from Karla Sam sent at 3/25/2024  1:05 PM CDT -----  Regarding: refill  .Type:  RX Refill Request    Who Called: Pt    Refill or New Rx:refill    RX Name and Strength:promethazine-codeine 6.25-10 mg/5 ml (PHENERGAN WITH CODEINE) 6.25-10 mg/5 mL syrup - -  - --  Sig: promethazine 6.25 mg-codeine 10 mg/5 mL syrup      How is the patient currently taking it? (ex. 1XDay):    Is this a 30 day or 90 day RX:    Preferred Pharmacy with phone number:[id number] - [place] - [address] AT [facility] [address] [facility]   Phone: [phone]  Fax: [phone]        Local or Mail Order:local    Ordering Provider:    Would the patient rather a call back or a response via [facility]?     Best Call Back Number:[phone]    Additional Information: PT is requesting that a refill on cough medication be sent into pharmacy due to her experiencing a consistent cough; please advise. Thanks.

## 2024-03-25 NOTE — TELEPHONE ENCOUNTER
Notified pt that she needs to make an appt in order to refill the medication requested per Dr Dodge. Verbal understanding given. Pt transferred to the front office to make an appt.

## 2024-03-27 ENCOUNTER — OFFICE VISIT (OUTPATIENT)
Dept: FAMILY MEDICINE | Facility: CLINIC | Age: 69
End: 2024-03-27
Payer: MEDICARE

## 2024-03-27 VITALS
TEMPERATURE: 98 F | BODY MASS INDEX: 30.33 KG/M2 | WEIGHT: 164.81 LBS | HEIGHT: 62 IN | DIASTOLIC BLOOD PRESSURE: 81 MMHG | SYSTOLIC BLOOD PRESSURE: 125 MMHG | OXYGEN SATURATION: 97 % | HEART RATE: 86 BPM | RESPIRATION RATE: 20 BRPM

## 2024-03-27 DIAGNOSIS — R05.2 SUBACUTE COUGH: ICD-10-CM

## 2024-03-27 DIAGNOSIS — J84.10 FIBROSIS OF LUNG: Primary | ICD-10-CM

## 2024-03-27 DIAGNOSIS — I10 ESSENTIAL HYPERTENSION: ICD-10-CM

## 2024-03-27 DIAGNOSIS — J40 BRONCHITIS: ICD-10-CM

## 2024-03-27 PROCEDURE — 99214 OFFICE O/P EST MOD 30 MIN: CPT | Mod: ,,, | Performed by: FAMILY MEDICINE

## 2024-03-27 RX ORDER — CHOLECALCIFEROL (VITAMIN D3) 25 MCG
3 TABLET ORAL DAILY
COMMUNITY

## 2024-03-27 RX ORDER — PROMETHAZINE HYDROCHLORIDE AND CODEINE PHOSPHATE 6.25; 1 MG/5ML; MG/5ML
SOLUTION ORAL
Qty: 118 ML | Refills: 0 | Status: SHIPPED | OUTPATIENT
Start: 2024-03-27

## 2024-03-27 RX ORDER — AMLODIPINE BESYLATE 10 MG/1
10 TABLET ORAL DAILY
COMMUNITY

## 2024-03-27 RX ORDER — FLUTICASONE PROPIONATE 50 MCG
1 SPRAY, SUSPENSION (ML) NASAL DAILY
Qty: 16 G | Refills: 11 | Status: SHIPPED | OUTPATIENT
Start: 2024-03-27 | End: 2025-03-27

## 2024-03-27 RX ORDER — DIGOXIN 0.06 MG/1
1 TABLET ORAL DAILY
COMMUNITY

## 2024-03-27 NOTE — PROGRESS NOTES
Alondra Vigil  03/27/2024  [id number]    Krystal Sena MD  Patient Care Team:  Krystal Sena MD as PCP - General (Family Medicine)  Sheldon Richards MD as Consulting Physician (Cardiology)  Mary Anne Benedict LPN as Care Coordinator      Chief Complaint:  Chief Complaint   Patient presents with    Cough     Pt is c/o post nasal drip, runny nose and productive cough for abut 2 weeks       History of Present Illness:    68 y.o. female who presents today c/o 2 wks of postnasal drip, rhinorrhea and cough productive of clear sputum for 2 wks. 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 She is using duonebs and albuterol which is helping to move phlegm     Review of Systems  General: denies f/c, weight loss, night sweats, decreased appetite  Eye: denies blurred vision, changes in vision  Cardiovascular: denies chest pain, palpitations, edema  Gastrointestinal: denies abdominal pain, n/v, constipation, diarrhea  Integumentary: denies rashes, pruritis    Past Medical History  Past Medical History:   Diagnosis Date    Acute ischemic cerebrovascular accident (CVA) involving left middle cerebral artery territory 03/02/2022    Anemia     Anxiety disorder, unspecified     Chronic systolic CHF (congestive heart failure)     CKD (chronic kidney disease) stage 3, GFR 30-59 ml/min     Clotting disorder     Essential (primary) hypertension     Paroxysmal atrial fibrillation     Postsurgical hypothyroidism     Von Willebrand disease        Medications  Medication List with Changes/Refills   New Medications    FLUTICASONE PROPIONATE (FLONASE) 50 MCG/ACTUATION NASAL SPRAY    1 spray (50 mcg total) by Each Nostril route once daily.   Current Medications    ALBUTEROL (PROVENTIL/VENTOLIN HFA) 90 MCG/ACTUATION INHALER    albuterol sulfate HFA 90 mcg/actuation aerosol inhaler    ALBUTEROL-IPRATROPIUM (DUO-NEB) 2.5 MG-0.5 MG/3 ML NEBULIZER SOLUTION    Take 3 mLs by nebulization every 6  (six) hours while awake. Rescue    AMLODIPINE (NORVASC) 10 MG TABLET    Take 10 mg by mouth once daily.    ARMOUR THYROID 15 MG TAB    Take 15 mg by mouth once daily.    ASCORBIC ACID, VITAMIN C, (VITAMIN C) 1000 MG TABLET    Take 1 tablet by mouth every morning.    ATORVASTATIN (LIPITOR) 20 MG TABLET    atorvastatin 20 mg tablet   TAKE ONE TABLET BY MOUTH AT BEDTIME    BUDESONIDE (PULMICORT) 0.5 MG/2 ML NEBULIZER SOLUTION    Inhale 0.5 mg into the lungs.    BUSPIRONE (BUSPAR) 7.5 MG TABLET    Take 7.5 mg by mouth 3 (three) times daily.    CHOLECALCIFEROL, VITAMIN D3, (VITAMIN D3) 50 MCG (2,000 UNIT) TAB    once daily.    FERROUS SULFATE (IRON ORAL)    Take 1 tablet by mouth once daily.    LANOXIN 62.5 MCG (0.0625 MG) TAB    Take 1 tablet by mouth once daily.    METOPROLOL SUCCINATE (TOPROL-XL) 50 MG 24 HR TABLET    Take 25 mg by mouth once daily.    METOPROLOL SUCCINATE 25 MG CSPX    Take 1 tablet by mouth Daily.    MONTELUKAST (SINGULAIR) 10 MG TABLET    Take 10 mg by mouth Daily.    PANTOPRAZOLE (PROTONIX) 40 MG TABLET    Take 40 mg by mouth 2 (two) times a day. 30 minutes after breakfast and dinner    POTASSIUM ORAL    Take by mouth once daily.    SPIRONOLACTONE (ALDACTONE) 25 MG TABLET    Take 25 mg by mouth as needed. Taking three times a week    ZINC SULFATE (ZINCATE) 50 MG ZINC (220 MG) CAPSULE    Take 1 capsule by mouth every morning.   Changed and/or Refilled Medications    Modified Medication Previous Medication    PROMETHAZINE-CODEINE 6.25-10 MG/5 ML (PHENERGAN WITH CODEINE) 6.25-10 MG/5 ML SYRUP promethazine-codeine 6.25-10 mg/5 ml (PHENERGAN WITH CODEINE) 6.25-10 mg/5 mL syrup       promethazine 6.25 mg-codeine 10 mg/5 mL syrup  TAKE 5ML BY MOUTH EVERY 4 TO 6 HOURS AS NEEDED FOR COUGH    promethazine 6.25 mg-codeine 10 mg/5 mL syrup   TAKE 5ML BY MOUTH EVERY 4 TO 6 HOURS AS NEEDED FOR COUGH   Discontinued Medications    AMLODIPINE (NORVASC) 5 MG TABLET    Take 5 mg by mouth Daily.    ESTRADIOL  "(ESTRACE) 0.01 % (0.1 MG/GRAM) VAGINAL CREAM    Use a pea sized amount to the vagina once a night for 14 nights when initiating the medication, then 2-3 times per week.  DO NOT use the applicator.    TROSPIUM (SANCTURA) 20 MG TAB TABLET    Take 1 tablet (20 mg total) by mouth 2 (two) times daily.    VIBEGRON 75 MG TAB    Take 75 mg by mouth once daily.       Past Surgical History:   Procedure Laterality Date    APPENDECTOMY      CHOLECYSTECTOMY      GASTROSTOMY TUBE PLACEMENT  03/02/2022    HYSTERECTOMY      OOPHORECTOMY Bilateral     THYROIDECTOMY, PARTIAL      TONSILLECTOMY      TOTAL ABDOMINAL HYSTERECTOMY W/ BILATERAL SALPINGOOPHORECTOMY      TRACHEOSTOMY  03/02/2022       SUBJECTIVE:  Health Maintenance  Health Maintenance Topics with due status: Not Due       Topic Last Completion Date    Colorectal Cancer Screening 09/28/2021    DEXA Scan 12/28/2022    High Dose Statin 04/25/2023    Lipid Panel 06/06/2023    Hemoglobin A1c (Diabetic Prevention Screening) 03/14/2024     Health Maintenance Due   Topic Date Due    COVID-19 Vaccine (1) Never done    TETANUS VACCINE  Never done    Shingles Vaccine (1 of 2) Never done    RSV Vaccine (Age 60+ and Pregnant patients) (1 - 1-dose 60+ series) Never done    Influenza Vaccine (1) Never done    Mammogram  09/13/2023       Exam:  Vitals:    03/27/24 0959   BP: 125/81   BP Location: Left arm   Patient Position: Sitting   BP Method: Large (Automatic)   Pulse: 86   Resp: 20   Temp: 98.2 °F (36.8 °C)   TempSrc: Temporal   SpO2: 97%   Weight: 74.8 kg (164 lb 12.8 oz)   Height: 5' 2" (1.575 m)     Weight: 74.8 kg (164 lb 12.8 oz)   Body mass index is 30.14 kg/m².      Physical Exam  Constitutional: NAD, alert, pleasant  Respiratory: CTAB, no wheezes, rales or rhonchi. No accessory muscle use  Eyes: EOMI  Cardiovascular: RRR, No m/r/g. No JVD. No LE edema  Integumentary: warm, dry, intact  Psych: AA&Ox3      ICD-10-CM ICD-9-CM   1. Fibrosis of lung  J84.10 515   2. Essential " hypertension  I10 401.9   3. Subacute cough  R05.2 786.2   4. Bronchitis  J40 490       1. Fibrosis of lung  Overview:  Mild, from covid. Saw pulm and does not feel like it needs treatment or that it will progress      2. Essential hypertension  Overview:  At goal on current meds. Sees Dr. Richards. Asymptomatic     Continue current Rx meds        3. Subacute cough  -     fluticasone propionate (FLONASE) 50 mcg/actuation nasal spray; 1 spray (50 mcg total) by Each Nostril route once daily.  Dispense: 16 g; Refill: 11  -     promethazine-codeine 6.25-10 mg/5 ml (PHENERGAN WITH CODEINE) 6.25-10 mg/5 mL syrup; promethazine 6.25 mg-codeine 10 mg/5 mL syrup  TAKE 5ML BY MOUTH EVERY 4 TO 6 HOURS AS NEEDED FOR COUGH  Dispense: 118 mL; Refill: 0  -     X-Ray Chest PA And Lateral; Future; Expected date: 03/27/2024    4. Bronchitis  -     fluticasone propionate (FLONASE) 50 mcg/actuation nasal spray; 1 spray (50 mcg total) by Each Nostril route once daily.  Dispense: 16 g; Refill: 11  -     promethazine-codeine 6.25-10 mg/5 ml (PHENERGAN WITH CODEINE) 6.25-10 mg/5 mL syrup; promethazine 6.25 mg-codeine 10 mg/5 mL syrup  TAKE 5ML BY MOUTH EVERY 4 TO 6 HOURS AS NEEDED FOR COUGH  Dispense: 118 mL; Refill: 0  -     X-Ray Chest PA And Lateral; Future; Expected date: 03/27/2024       Start plain mucinex with a lot of water  Use flonase twice a day  Continue duonebs as needed for cough, wheeze or shortness of breath  Continue singulair and claritin  Cxr today    Follow up: No follow-ups on file.      Care Plan/Goals: Reviewed   Goals    None

## 2024-03-27 NOTE — PATIENT INSTRUCTIONS
Start plain mucinex with a lot of water  Use flonase twice a day  Continue duonebs as needed for cough, wheeze or shortness of breath  Continue singulair and claritin

## 2024-04-15 ENCOUNTER — LAB VISIT (OUTPATIENT)
Dept: LAB | Facility: HOSPITAL | Age: 69
End: 2024-04-15
Attending: NURSE PRACTITIONER
Payer: MEDICARE

## 2024-04-15 DIAGNOSIS — R10.11 ABDOMINAL PAIN, RIGHT UPPER QUADRANT: Primary | ICD-10-CM

## 2024-04-15 DIAGNOSIS — K29.60 EROSIVE GASTRITIS: ICD-10-CM

## 2024-04-15 LAB
ALBUMIN SERPL-MCNC: 3.9 G/DL (ref 3.4–4.8)
ALBUMIN/GLOB SERPL: 1.4 RATIO (ref 1.1–2)
ALP SERPL-CCNC: 79 UNIT/L (ref 40–150)
ALT SERPL-CCNC: 19 UNIT/L (ref 0–55)
AST SERPL-CCNC: 18 UNIT/L (ref 5–34)
BASOPHILS # BLD AUTO: 0.06 X10(3)/MCL
BASOPHILS NFR BLD AUTO: 0.9 %
BILIRUB SERPL-MCNC: 0.5 MG/DL
BUN SERPL-MCNC: 22 MG/DL (ref 9.8–20.1)
CALCIUM SERPL-MCNC: 9.5 MG/DL (ref 8.4–10.2)
CHLORIDE SERPL-SCNC: 106 MMOL/L (ref 98–107)
CO2 SERPL-SCNC: 28 MMOL/L (ref 23–31)
CREAT SERPL-MCNC: 1.29 MG/DL (ref 0.55–1.02)
EOSINOPHIL # BLD AUTO: 0.06 X10(3)/MCL (ref 0–0.9)
EOSINOPHIL NFR BLD AUTO: 0.9 %
ERYTHROCYTE [DISTWIDTH] IN BLOOD BY AUTOMATED COUNT: 12.2 % (ref 11.5–17)
GFR SERPLBLD CREATININE-BSD FMLA CKD-EPI: 45 MLS/MIN/1.73/M2
GLOBULIN SER-MCNC: 2.7 GM/DL (ref 2.4–3.5)
GLUCOSE SERPL-MCNC: 111 MG/DL (ref 82–115)
HCT VFR BLD AUTO: 39 % (ref 37–47)
HGB BLD-MCNC: 13 G/DL (ref 12–16)
IMM GRANULOCYTES # BLD AUTO: 0.02 X10(3)/MCL (ref 0–0.04)
IMM GRANULOCYTES NFR BLD AUTO: 0.3 %
LIPASE SERPL-CCNC: 15 U/L
LYMPHOCYTES # BLD AUTO: 1.64 X10(3)/MCL (ref 0.6–4.6)
LYMPHOCYTES NFR BLD AUTO: 25.9 %
MCH RBC QN AUTO: 31.1 PG (ref 27–31)
MCHC RBC AUTO-ENTMCNC: 33.3 G/DL (ref 33–36)
MCV RBC AUTO: 93.3 FL (ref 80–94)
MONOCYTES # BLD AUTO: 0.43 X10(3)/MCL (ref 0.1–1.3)
MONOCYTES NFR BLD AUTO: 6.8 %
NEUTROPHILS # BLD AUTO: 4.13 X10(3)/MCL (ref 2.1–9.2)
NEUTROPHILS NFR BLD AUTO: 65.2 %
NRBC BLD AUTO-RTO: 0 %
PLATELET # BLD AUTO: 327 X10(3)/MCL (ref 130–400)
PMV BLD AUTO: 9.1 FL (ref 7.4–10.4)
POTASSIUM SERPL-SCNC: 3.7 MMOL/L (ref 3.5–5.1)
PROT SERPL-MCNC: 6.6 GM/DL (ref 5.8–7.6)
RBC # BLD AUTO: 4.18 X10(6)/MCL (ref 4.2–5.4)
SODIUM SERPL-SCNC: 141 MMOL/L (ref 136–145)
WBC # SPEC AUTO: 6.34 X10(3)/MCL (ref 4.5–11.5)

## 2024-04-15 PROCEDURE — 83690 ASSAY OF LIPASE: CPT

## 2024-04-15 PROCEDURE — 80053 COMPREHEN METABOLIC PANEL: CPT

## 2024-04-15 PROCEDURE — 36415 COLL VENOUS BLD VENIPUNCTURE: CPT

## 2024-04-15 PROCEDURE — 85025 COMPLETE CBC W/AUTO DIFF WBC: CPT

## 2024-04-19 ENCOUNTER — TELEPHONE (OUTPATIENT)
Dept: FAMILY MEDICINE | Facility: CLINIC | Age: 69
End: 2024-04-19
Payer: MEDICARE

## 2024-04-19 NOTE — TELEPHONE ENCOUNTER
Spoke to pt. She was asking that our office fax some results to her dietician that Dr Dodge did not order. I advised her to contact the ordering physician office to fax the results or to have the dietician send a signed release to get the records. Verbal understanding given

## 2024-04-19 NOTE — TELEPHONE ENCOUNTER
----- Message from Karla Gudino sent at 4/18/2024  2:58 PM CDT -----  Regarding: advice  .Type:  Needs Medical Advice    Who Called: pt    Symptoms (please be specific):      How long has patient had these symptoms:      Pharmacy name and phone #:      Would the patient rather a call back or a response via [facility]?     Best Call Back Number: [phone]    Additional Information: pt is requesting to speak w/ the nurse in regards to a question that she has regarding a dietician;please advise. Thanks.

## 2024-05-10 ENCOUNTER — LAB VISIT (OUTPATIENT)
Dept: LAB | Facility: HOSPITAL | Age: 69
End: 2024-05-10
Attending: FAMILY MEDICINE
Payer: MEDICARE

## 2024-05-10 DIAGNOSIS — E89.0 POSTOPERATIVE HYPOTHYROIDISM: ICD-10-CM

## 2024-05-10 DIAGNOSIS — I10 ESSENTIAL HYPERTENSION, MALIGNANT: ICD-10-CM

## 2024-05-10 DIAGNOSIS — Z13.220 ENCOUNTER FOR LIPID SCREENING FOR CARDIOVASCULAR DISEASE: ICD-10-CM

## 2024-05-10 DIAGNOSIS — Z13.1 SCREENING FOR DIABETES MELLITUS: ICD-10-CM

## 2024-05-10 DIAGNOSIS — I10 ESSENTIAL HYPERTENSION: ICD-10-CM

## 2024-05-10 DIAGNOSIS — E78.2 MIXED HYPERLIPIDEMIA: ICD-10-CM

## 2024-05-10 DIAGNOSIS — I48.0 PAROXYSMAL ATRIAL FIBRILLATION: ICD-10-CM

## 2024-05-10 DIAGNOSIS — Z13.6 ENCOUNTER FOR LIPID SCREENING FOR CARDIOVASCULAR DISEASE: ICD-10-CM

## 2024-05-10 DIAGNOSIS — Z79.899 ENCOUNTER FOR LONG-TERM (CURRENT) USE OF OTHER MEDICATIONS: Primary | ICD-10-CM

## 2024-05-10 DIAGNOSIS — R79.9 ABNORMAL FINDING OF BLOOD CHEMISTRY, UNSPECIFIED: ICD-10-CM

## 2024-05-10 DIAGNOSIS — E78.5 HYPERLIPIDEMIA, UNSPECIFIED HYPERLIPIDEMIA TYPE: ICD-10-CM

## 2024-05-10 LAB
ALBUMIN SERPL-MCNC: 3.8 G/DL (ref 3.4–4.8)
ALBUMIN/GLOB SERPL: 1.5 RATIO (ref 1.1–2)
ALP SERPL-CCNC: 73 UNIT/L (ref 40–150)
ALT SERPL-CCNC: 20 UNIT/L (ref 0–55)
AST SERPL-CCNC: 17 UNIT/L (ref 5–34)
BASOPHILS # BLD AUTO: 0.04 X10(3)/MCL
BASOPHILS NFR BLD AUTO: 0.7 %
BILIRUB SERPL-MCNC: 0.2 MG/DL
BUN SERPL-MCNC: 22 MG/DL (ref 9.8–20.1)
CALCIUM SERPL-MCNC: 9.3 MG/DL (ref 8.4–10.2)
CHLORIDE SERPL-SCNC: 104 MMOL/L (ref 98–107)
CHOLEST SERPL-MCNC: 201 MG/DL
CHOLEST/HDLC SERPL: 3 {RATIO} (ref 0–5)
CO2 SERPL-SCNC: 28 MMOL/L (ref 23–31)
CREAT SERPL-MCNC: 1.21 MG/DL (ref 0.55–1.02)
DIGOXIN SERPL-MCNC: <0.2 NG/ML (ref 0.8–2)
EOSINOPHIL # BLD AUTO: 0.12 X10(3)/MCL (ref 0–0.9)
EOSINOPHIL NFR BLD AUTO: 2.2 %
ERYTHROCYTE [DISTWIDTH] IN BLOOD BY AUTOMATED COUNT: 12.4 % (ref 11.5–17)
EST. AVERAGE GLUCOSE BLD GHB EST-MCNC: 111.2 MG/DL
GFR SERPLBLD CREATININE-BSD FMLA CKD-EPI: 48 ML/MIN/1.73/M2
GLOBULIN SER-MCNC: 2.5 GM/DL (ref 2.4–3.5)
GLUCOSE SERPL-MCNC: 103 MG/DL (ref 82–115)
HBA1C MFR BLD: 5.5 %
HCT VFR BLD AUTO: 37.7 % (ref 37–47)
HDLC SERPL-MCNC: 67 MG/DL (ref 35–60)
HGB BLD-MCNC: 12.9 G/DL (ref 12–16)
IMM GRANULOCYTES # BLD AUTO: 0.02 X10(3)/MCL (ref 0–0.04)
IMM GRANULOCYTES NFR BLD AUTO: 0.4 %
LDLC SERPL CALC-MCNC: 113 MG/DL (ref 50–140)
LYMPHOCYTES # BLD AUTO: 1.54 X10(3)/MCL (ref 0.6–4.6)
LYMPHOCYTES NFR BLD AUTO: 28 %
MCH RBC QN AUTO: 31.8 PG (ref 27–31)
MCHC RBC AUTO-ENTMCNC: 34.2 G/DL (ref 33–36)
MCV RBC AUTO: 92.9 FL (ref 80–94)
MONOCYTES # BLD AUTO: 0.38 X10(3)/MCL (ref 0.1–1.3)
MONOCYTES NFR BLD AUTO: 6.9 %
NEUTROPHILS # BLD AUTO: 3.4 X10(3)/MCL (ref 2.1–9.2)
NEUTROPHILS NFR BLD AUTO: 61.8 %
NRBC BLD AUTO-RTO: 0 %
PLATELET # BLD AUTO: 281 X10(3)/MCL (ref 130–400)
PMV BLD AUTO: 8.8 FL (ref 7.4–10.4)
POTASSIUM SERPL-SCNC: 3.8 MMOL/L (ref 3.5–5.1)
PROT SERPL-MCNC: 6.3 GM/DL (ref 5.8–7.6)
RBC # BLD AUTO: 4.06 X10(6)/MCL (ref 4.2–5.4)
SODIUM SERPL-SCNC: 138 MMOL/L (ref 136–145)
TRIGL SERPL-MCNC: 104 MG/DL (ref 37–140)
TSH SERPL-ACNC: 2.1 UIU/ML (ref 0.35–4.94)
VLDLC SERPL CALC-MCNC: 21 MG/DL
WBC # SPEC AUTO: 5.5 X10(3)/MCL (ref 4.5–11.5)

## 2024-05-10 PROCEDURE — 80162 ASSAY OF DIGOXIN TOTAL: CPT

## 2024-05-10 PROCEDURE — 36415 COLL VENOUS BLD VENIPUNCTURE: CPT

## 2024-05-10 PROCEDURE — 85025 COMPLETE CBC W/AUTO DIFF WBC: CPT

## 2024-05-10 PROCEDURE — 84443 ASSAY THYROID STIM HORMONE: CPT

## 2024-05-10 PROCEDURE — 83036 HEMOGLOBIN GLYCOSYLATED A1C: CPT

## 2024-05-10 PROCEDURE — 80053 COMPREHEN METABOLIC PANEL: CPT

## 2024-05-10 PROCEDURE — 80061 LIPID PANEL: CPT

## 2024-05-16 ENCOUNTER — TELEPHONE (OUTPATIENT)
Dept: FAMILY MEDICINE | Facility: CLINIC | Age: 69
End: 2024-05-16

## 2024-05-16 ENCOUNTER — OFFICE VISIT (OUTPATIENT)
Dept: FAMILY MEDICINE | Facility: CLINIC | Age: 69
End: 2024-05-16
Payer: MEDICARE

## 2024-05-16 ENCOUNTER — PATIENT OUTREACH (OUTPATIENT)
Dept: ADMINISTRATIVE | Facility: HOSPITAL | Age: 69
End: 2024-05-16
Payer: MEDICARE

## 2024-05-16 VITALS
BODY MASS INDEX: 30.2 KG/M2 | HEIGHT: 62 IN | SYSTOLIC BLOOD PRESSURE: 130 MMHG | TEMPERATURE: 98 F | RESPIRATION RATE: 18 BRPM | HEART RATE: 70 BPM | OXYGEN SATURATION: 98 % | WEIGHT: 164.13 LBS | DIASTOLIC BLOOD PRESSURE: 81 MMHG

## 2024-05-16 DIAGNOSIS — T46.6X5A MYALGIA DUE TO STATIN: ICD-10-CM

## 2024-05-16 DIAGNOSIS — I50.22 CHRONIC SYSTOLIC CONGESTIVE HEART FAILURE: ICD-10-CM

## 2024-05-16 DIAGNOSIS — I48.0 PAROXYSMAL ATRIAL FIBRILLATION: ICD-10-CM

## 2024-05-16 DIAGNOSIS — I70.0 AORTIC ATHEROSCLEROSIS: ICD-10-CM

## 2024-05-16 DIAGNOSIS — I69.30 HISTORY OF CEREBROVASCULAR ACCIDENT (CVA) WITH RESIDUAL DEFICIT: ICD-10-CM

## 2024-05-16 DIAGNOSIS — E89.0 POSTOPERATIVE HYPOTHYROIDISM: ICD-10-CM

## 2024-05-16 DIAGNOSIS — I10 ESSENTIAL HYPERTENSION: ICD-10-CM

## 2024-05-16 DIAGNOSIS — N18.32 STAGE 3B CHRONIC KIDNEY DISEASE: ICD-10-CM

## 2024-05-16 DIAGNOSIS — K22.70 BARRETT'S ESOPHAGUS WITHOUT DYSPLASIA: ICD-10-CM

## 2024-05-16 DIAGNOSIS — J84.10 FIBROSIS OF LUNG: ICD-10-CM

## 2024-05-16 DIAGNOSIS — Z86.010 PERSONAL HISTORY OF COLONIC POLYPS: ICD-10-CM

## 2024-05-16 DIAGNOSIS — M79.10 MYALGIA DUE TO STATIN: ICD-10-CM

## 2024-05-16 DIAGNOSIS — Z00.00 MEDICARE ANNUAL WELLNESS VISIT, SUBSEQUENT: Primary | ICD-10-CM

## 2024-05-16 PROCEDURE — G0439 PPPS, SUBSEQ VISIT: HCPCS | Mod: ,,, | Performed by: FAMILY MEDICINE

## 2024-05-16 RX ORDER — IBUPROFEN 200 MG
1 CAPSULE ORAL 3 TIMES DAILY
COMMUNITY

## 2024-05-16 NOTE — LETTER
AUTHORIZATION FOR RELEASE OF   CONFIDENTIAL INFORMATION    Dear Dr. Richards,    We are seeing Alondra Vigil, date of birth 1955, in the clinic at [facility]. Krystal Sena MD is the patient's PCP. Alondra Vigil has an outstanding lab/procedure at the time we reviewed her chart. In order to help keep her health information updated, she has authorized us to request the following medical record(s):        (  )  MAMMOGRAM                                      (  )  COLONOSCOPY      (  )  PAP SMEAR                                          (  )  OUTSIDE LAB RESULTS     (  )  DEXA SCAN                                          (  )  EYE EXAM            (  )  FOOT EXAM                                          (  )  ENTIRE RECORD     (  )  OUTSIDE IMMUNIZATIONS                 ( X ) LAST OFFICE NOTE         Please fax records to Krystal Sena MD, [phone]     If you have any questions, please contact Anny Vann MA at [phone].           Patient Name: Alondra Vigil  : 1955  Patient Phone #: [phone]

## 2024-05-16 NOTE — PROGRESS NOTES
Patient ID: [id number]     Chief Complaint: Medicare AWV (Medicare Wellness with lab results)      HPI:     Alondra Vigil is a 69 y.o. female here today for a Medicare Wellness. Labs reviewed by me and were discussed with patient. All questions regarding lab results were answered. She is up to date with mmg so I will request this. Denies vaginal bleeding.         Opioid Screening: Patient medication list reviewed, patient is not taking prescription opioids. Patient is not using additional opioids than prescribed. Patient is at low risk of substance abuse based on this opioid use history.       -------------------------------------    Acute ischemic cerebrovascular accident (CVA) involving left middle cerebral artery territory    Anemia    Anxiety disorder, unspecified    Chronic systolic CHF (congestive heart failure)    CKD (chronic kidney disease) stage 3, GFR 30-59 ml/min    Clotting disorder    Essential (primary) hypertension    Paroxysmal atrial fibrillation    Postsurgical hypothyroidism    Von Willebrand disease        Past Surgical History:   Procedure Laterality Date    APPENDECTOMY      CHOLECYSTECTOMY      GASTROSTOMY TUBE PLACEMENT  03/02/2022    HYSTERECTOMY      OOPHORECTOMY Bilateral     THYROIDECTOMY, PARTIAL      TONSILLECTOMY      TOTAL ABDOMINAL HYSTERECTOMY W/ BILATERAL SALPINGOOPHORECTOMY      TRACHEOSTOMY  03/02/2022       Review of patient's allergies indicates:   Allergen Reactions    Iodine Hives    Penicillins Hives     Tolerating  Zosyn and Augmentin      Sulfa (sulfonamide antibiotics) Hives    Amlodipine     Aspirin Other (See Comments)     bleeding  Other reaction(s): Bleeding (finding)  Von Willebrand Disease  Von Willebrand Disease  Von Willebrand Disease      Digoxin     Lidocaine     Phenylephrine Hives    Phenylephrine hcl Hives    Procaine Other (See Comments)    Triamterene     Valacyclovir Other (See Comments)     unknown    Prednisone Palpitations and Other (See Comments)      Other reaction(s): Polyvinyl chloride (substance), Premature atrial contraction (disorder), Tachycardia (finding)         Outpatient Medications Marked as Taking for the 5/16/24 encounter (Office Visit) with Krystal Sena MD   Medication Sig Dispense Refill    albuterol-ipratropium (DUO-NEB) 2.5 mg-0.5 mg/3 mL nebulizer solution Take 3 mLs by nebulization every 6 (six) hours while awake. Rescue 75 mL 3    amLODIPine (NORVASC) 10 MG tablet Take 10 mg by mouth once daily.      ARMOUR THYROID 15 mg Tab Take 15 mg by mouth once daily.      ascorbic acid, vitamin C, (VITAMIN C) 1000 MG tablet Take 1 tablet by mouth every morning.      budesonide (PULMICORT) 0.5 mg/2 mL nebulizer solution Inhale 0.5 mg into the lungs.      busPIRone (BUSPAR) 7.5 MG tablet Take 7.5 mg by mouth 3 (three) times daily.      calcium citrate (CALCITRATE) 200 mg (950 mg) tablet Take 1 tablet by mouth 3 (three) times daily.      ferrous sulfate (IRON ORAL) Take 1 tablet by mouth once daily.      fluticasone propionate (FLONASE) 50 mcg/actuation nasal spray 1 spray (50 mcg total) by Each Nostril route once daily. 16 g 11    LANOXIN 62.5 mcg (0.0625 mg) Tab Take 1 tablet by mouth once daily.      montelukast (SINGULAIR) 10 mg tablet Take 10 mg by mouth Daily.      pantoprazole (PROTONIX) 40 MG tablet Take 40 mg by mouth 2 (two) times a day. 30 minutes after breakfast and dinner      POTASSIUM ORAL Take by mouth once daily.      vitamin D (VITAMIN D3) 1000 units Tab 3 tablets once daily.       Current Facility-Administered Medications for the 5/16/24 encounter (Office Visit) with Krystal Sena MD   Medication Dose Route Frequency Provider Last Rate Last Admin    mupirocin 2 % ointment   Topical (Top) 1 time in Clinic/HOD Krystal Sena MD           Social History     Socioeconomic History    Marital status:      Spouse name: Dylan    Number of children: 4   Tobacco Use    Smoking status: Former     Current  packs/day: 1.00     Average packs/day: 1 pack/day for 21.0 years (21.0 ttl pk-yrs)     Types: Cigarettes     Passive exposure: Past    Smokeless tobacco: Never    Tobacco comments:     Pt quit smoking 25 plus years ago.   Substance and Sexual Activity    Alcohol use: Yes     Comment: socially    Drug use: Never    Sexual activity: Not Currently     Social Determinants of Health     Financial Resource Strain: Low Risk  (5/16/2024)    Overall Financial Resource Strain (CARDIA)     Difficulty of Paying Living Expenses: Not hard at all   Food Insecurity: No Food Insecurity (5/16/2024)    Hunger Vital Sign     Worried About Running Out of Food in the Last Year: Never true     Ran Out of Food in the Last Year: Never true   Transportation Needs: No Transportation Needs (5/16/2024)    TRANSPORTATION NEEDS     Transportation : No   Physical Activity: Insufficiently Active (5/16/2024)    Exercise Vital Sign     Days of Exercise per Week: 2 days     Minutes of Exercise per Session: 60 min   Stress: No Stress Concern Present (2/11/2022)    Received from [facility], [facility]    [employer] - Occupational Stress Questionnaire     Feeling of Stress : Not at all   Housing Stability: Low Risk  (5/16/2024)    Housing Stability Vital Sign     Unable to Pay for Housing in the Last Year: No     Homeless in the Last Year: No        Family History   Problem Relation Name Age of Onset    Kidney disease Mother      Kidney disease Father          Patient Care Team:  Krystal Sena MD as PCP - General (Family Medicine)  Sheldon Richards MD as Consulting Physician (Cardiology)  Mary Anne Benedict LPN as Care Coordinator       Subjective:     ROS    General: denies f/c, weight loss, night sweats, decreased appetite, melena, hematochezia, recent falls,  depression  Eye: denies blurred vision, changes in vision  Respiratory: denies sob, wheezing, cough  Cardiovascular: denies chest pain, palpitations, edema  Gastrointestinal: denies abdominal pain, n/v, constipation, diarrhea  Integumentary: denies rashes, pruritis  Psych: denies depression, si/hi, anhedonia    Patient Reported Health Risk Assessment  What [age] 70-79  Are you male or female?: Female  During the past four weeks, how much have you been bothered by emotional problems such as feeling anxious, depressed, irritable, sad, or downhearted and blue?: Not at all  During the past five weeks, has your physical and/or emotional health limited your social activities with family, friends, neighbors, or groups?: Not at all  During the past four weeks, how much bodily pain have you generally had?: Very mild pain  During the past four weeks, was someone available to help if you needed and wanted help?: Yes, as much as I wanted  During the past four weeks, what was the hardest physical activity you could do for at least two minutes?: Moderate  Can you get to places out of walking distance without help?  (For example, can you travel alone on buses or taxis, or drive your own car?): Yes  Can you go shopping for groceries or clothes without someone's help?: Yes  Can you prepare your own meals?: Yes  Can you do your own housework without help?: Yes  Because of any health problems, do you need the help of another person with your personal care needs such as eating, bathing, dressing, or getting around the house?: No  Can you handle your own money without help?: Yes  During the past four weeks, how would you rate your health in general?: Fair  How have things been going for you during the past four weeks?: Pretty well  Are you having difficulties driving your car?: No  Do you always fasten your seat belt when you are in a car?: Yes, usually  How often in the past four weeks have you been bothered by falling or dizzy  "when standing up?: Never  How often in the past four weeks have you been bothered by trouble eating well?: Never  How often in the past four weeks have you been bothered by teeth or denture problems?: Never  How often in the past four weeks have you been bothered with problems using the telephone?: Never  How often in the past four weeks have you been bothered by tiredness or fatigue?: Always  Have you fallen two or more times in the past year?: No  Are you afraid of falling?: No  Are you a smoker?: No  During the past four weeks, how many drinks of wine, beer, or other alcoholic beverages did you have?: No alcohol at all  Do you exercise for about 20 minutes three or more days a week?: Yes, most of the time  Have you been given any information to help you with hazards in your house that might hurt you?: No  Have you been given any information to help you with keeping track of your medications?: No  How often do you have trouble taking medicines the way you've been told to take them?: I always take them as prescribed  How confident are you that you can control and manage most of your health problems?: Very confident  What is your race? (Check all that apply.):     Objective:     /81 (BP Location: Left arm, Patient Position: Sitting, BP Method: Large (Automatic))   Pulse 70   Temp 98 °F (36.7 °C) (Oral)   Resp 18   Ht 5' 2" (1.575 m)   Wt 74.4 kg (164 lb 1.6 oz)   LMP  (LMP Unknown)   SpO2 98%   BMI 30.01 kg/m²     Physical Exam    Constitutional: NAD, alert, pleasant  Respiratory: CTAB, no wheezes, rales or rhonchi. No accessory muscle use  Eyes: EOMI  Cardiovascular: RRR, No m/r/g. No JVD. No LE edema  Gastrointestinal: BS+, nontender, nondistended  Integumentary: warm, dry, intact  Psych: AA&Ox3           No data to display                  5/16/2024     8:15 AM 3/27/2024    10:00 AM 8/3/2023     2:00 PM 6/8/2023     1:45 PM 4/6/2023     1:45 PM 3/8/2023     1:30 PM 12/21/2022     9:00 AM "   Fall Risk Assessment - Outpatient   Mobility Status Ambulatory Ambulatory Ambulatory Ambulatory Ambulatory Ambulatory Ambulatory   Number of falls 0 0 0 0 0 0 0   Identified as fall risk False False False False False False False           Depression Screening  Over the past two weeks, has the patient felt down, depressed, or hopeless?: No  Over the past two weeks, has the patient felt little interest or pleasure in doing things?: No  Functional Ability/Safety Screening  Was the patient's timed Up & Go test unsteady or longer than 30 seconds?: No  Does the patient need help with phone, transportation, shopping, preparing meals, housework, laundry, meds, or managing money?: No  Does the patient's home have rugs in the hallway, lack grab bars in the bathroom, lack handrails on the stairs or have poor lighting?: No  Have you noticed any hearing difficulties?: No  Cognitive Function (Assessed through direct observation with due consideration of information obtained by way of patient reports and/or concerns raised by family, friends, caretakers, or others)    Does the patient repeat questions/statements in the same day?: No  Does the patient have trouble remembering the date, year, and time?: No  Does the patient have difficulty managing finances?: No  Does the patient have a decreased sense of direction?: No  Assessment/Plan:     1. Medicare annual wellness visit, subsequent    2. History of cerebrovascular accident (CVA) with residual deficit  Overview:  Occurred while inpatient with covid. Now has aphasia. Nephrologist told patient to d/c statin. I informed patient to check with cardiologist before doing so      3. Fibrosis of lung  Overview:  Mild, from covid. Saw pulm and does not feel like it needs treatment or that it will progress      4. Paroxysmal atrial fibrillation  Overview:  On beta blockers and digoxin. Symptoms are stable. Sees Dr. Richards.     Continue current Rx meds        5. Chronic systolic  congestive heart failure  Overview:  Stable on current meds. BP at goal. Sees. Dr. Richards    Continue current Rx meds  .      6. Essential hypertension  Overview:  At goal on current meds. Sees Dr. Richards. Asymptomatic     Continue current Rx meds        7. Stage 3b chronic kidney disease  Overview:  Sees nephrology. No longer on dialysis. CKD is stable.     Continue current Rx meds        8. Aortic atherosclerosis  Overview:  Seen on  4/2024. Mild plaque. Not on statin. Patient declines it. She is followed by cardiology.     Continue current Rx meds        9. Postoperative hypothyroidism  Overview:  S/p partial thyroidectomy for benign nodule. TSH at goal. Seeing Dr. Joshi in [place]      Continue current Rx meds  Keep f/u appt with Dr. Joshi      10. Personal history of colonic polyps  Overview:  Had colonoscopy in 2021 with one sessile polyp. Due for repeat in 2026      11. Mayen's esophagus without dysplasia  Overview:  Sees Dr. Brooks. ON protonix and carafate. Symptoms are well controlled      12. Myalgia due to statin  Overview:  Patient declines statin due to myalgias             Medicare Annual Wellness and Personalized Prevention Plan:   Fall Risk + Home Safety + Hearing Impairment + Depression Screen + Opioid and Substance Abuse Screening + Cognitive Impairment Screen + Health Risk Assessment all reviewed.     Health Maintenance Topics with due status: Not Due       Topic Last Completion Date    Colorectal Cancer Screening 09/28/2021    DEXA Scan 12/28/2022    Hemoglobin A1c (Diabetic Prevention Screening) 05/10/2024    Lipid Panel 05/10/2024    Influenza Vaccine Not Due      The patient's Health Maintenance was reviewed and the following appears to be due at this time:   Health Maintenance Due   Topic Date Due    Annual UACr  Never done    TETANUS VACCINE  Never done    Shingles Vaccine (1 of 2) Never done    RSV Vaccine (Age 60+ and Pregnant patients) (1 - 1-dose 60+ series) Never done     COVID-19 Vaccine (1 - 2023-24 season) Never done    Mammogram  09/13/2023    High Dose Statin  03/07/2024       Advance Care Planning   I attest to discussing Advance Care Planning with patient and/or family member.  Education was provided including the importance of the Health Care Power of , Advance Directives, and/or [place] documentation.  The patient expressed understanding to the importance of this information and discussion.       Advance Care Planning     Date: 05/16/2024  Patient did not wish or was not able to name a surrogate decision maker or provide an Advance Care Plan.       Follow up for 6 mts htn and 1 year medicare wellness wtih labs. In addition to their scheduled follow up, the patient has also been instructed to follow up on as needed basis.

## 2024-05-16 NOTE — LETTER
AUTHORIZATION FOR RELEASE OF   CONFIDENTIAL INFORMATION    Dear EZRA,    We are seeing Alondra Vigil, date of birth 1955, in the clinic at [facility]. Krystal Sena MD is the patient's PCP. Alondra Vigil has an outstanding lab/procedure at the time we reviewed her chart. In order to help keep her health information updated, she has authorized us to request the following medical record(s):        ( X )  MAMMOGRAM                                      (  )  COLONOSCOPY      (  )  PAP SMEAR                                          (  )  OUTSIDE LAB RESULTS     (  )  DEXA SCAN                                          (  )  EYE EXAM            (  )  FOOT EXAM                                          (  )  ENTIRE RECORD     (  )  OUTSIDE IMMUNIZATIONS                 (  )  _______________         Please fax records to [facility], Bienvenu-Oubre, Shauna, MD, [phone] or [phone].       If you have any questions you can contact Ivy Anderson at [phone].           Patient Name: Alondra Vigil  : 1955  Patient Phone #: [phone]

## 2024-05-16 NOTE — PROGRESS NOTES
Health Maintenance Topic(s) Outreach Outcomes & Actions Taken:    Breast Cancer Screening - Outreach Outcomes & Actions Taken  : External Records Requested & Care Team Updated if Applicable and DENNIS sent to [facility]     Additional Notes:

## 2024-05-17 NOTE — PROGRESS NOTES
Health Maintenance Topic(s) Outreach Outcomes & Actions Taken:    Breast Cancer Screening - Outreach Outcomes & Actions Taken  : External Records Uploaded & Care Team Updated if Applicable     Additional Notes:  JOSUÉ 9/14/23

## 2024-10-03 ENCOUNTER — TELEPHONE (OUTPATIENT)
Dept: FAMILY MEDICINE | Facility: CLINIC | Age: 69
End: 2024-10-03

## 2024-10-03 ENCOUNTER — OFFICE VISIT (OUTPATIENT)
Dept: FAMILY MEDICINE | Facility: CLINIC | Age: 69
End: 2024-10-03
Payer: MEDICARE

## 2024-10-03 ENCOUNTER — OFFICE VISIT (OUTPATIENT)
Dept: URGENT CARE | Facility: CLINIC | Age: 69
End: 2024-10-03
Payer: MEDICARE

## 2024-10-03 VITALS
WEIGHT: 166 LBS | OXYGEN SATURATION: 97 % | HEART RATE: 65 BPM | WEIGHT: 166.63 LBS | HEIGHT: 62 IN | RESPIRATION RATE: 17 BRPM | TEMPERATURE: 98 F | HEIGHT: 62 IN | SYSTOLIC BLOOD PRESSURE: 127 MMHG | RESPIRATION RATE: 18 BRPM | TEMPERATURE: 98 F | DIASTOLIC BLOOD PRESSURE: 74 MMHG | DIASTOLIC BLOOD PRESSURE: 78 MMHG | HEART RATE: 66 BPM | BODY MASS INDEX: 30.55 KG/M2 | BODY MASS INDEX: 30.66 KG/M2 | OXYGEN SATURATION: 98 % | SYSTOLIC BLOOD PRESSURE: 134 MMHG

## 2024-10-03 DIAGNOSIS — Z53.21 PATIENT LEFT WITHOUT BEING SEEN: Primary | ICD-10-CM

## 2024-10-03 DIAGNOSIS — Z12.31 ENCOUNTER FOR SCREENING MAMMOGRAM FOR BREAST CANCER: ICD-10-CM

## 2024-10-03 DIAGNOSIS — M16.12 PRIMARY OSTEOARTHRITIS OF LEFT HIP: ICD-10-CM

## 2024-10-03 DIAGNOSIS — R79.9 ABNORMAL FINDING OF BLOOD CHEMISTRY, UNSPECIFIED: ICD-10-CM

## 2024-10-03 DIAGNOSIS — E89.0 POSTOPERATIVE HYPOTHYROIDISM: ICD-10-CM

## 2024-10-03 DIAGNOSIS — M41.9 SCOLIOSIS, UNSPECIFIED SCOLIOSIS TYPE, UNSPECIFIED SPINAL REGION: ICD-10-CM

## 2024-10-03 DIAGNOSIS — N18.32 STAGE 3B CHRONIC KIDNEY DISEASE: ICD-10-CM

## 2024-10-03 DIAGNOSIS — J30.89 NON-SEASONAL ALLERGIC RHINITIS, UNSPECIFIED TRIGGER: ICD-10-CM

## 2024-10-03 DIAGNOSIS — Z00.00 MEDICARE ANNUAL WELLNESS VISIT, SUBSEQUENT: Primary | ICD-10-CM

## 2024-10-03 DIAGNOSIS — I10 ESSENTIAL HYPERTENSION: ICD-10-CM

## 2024-10-03 PROCEDURE — 99499 UNLISTED E&M SERVICE: CPT | Mod: ,,, | Performed by: NURSE PRACTITIONER

## 2024-10-03 RX ORDER — MONTELUKAST SODIUM 10 MG/1
10 TABLET ORAL DAILY
Qty: 90 TABLET | Refills: 3 | Status: SHIPPED | OUTPATIENT
Start: 2024-10-03 | End: 2025-10-03

## 2024-10-03 RX ORDER — SUCRALFATE 1 G/1
1 TABLET ORAL
COMMUNITY
Start: 2024-06-17

## 2024-10-03 NOTE — PROGRESS NOTES
"Subjective:      Patient ID: Alondra Vigil is a 69 y.o. female.    Vitals:  height is 5' 2" (1.575 m) and weight is 75.3 kg (166 lb). Her temperature is 97.6 °F (36.4 °C). Her blood pressure is 127/74 and her pulse is 66. Her respiration is 17 and oxygen saturation is 97%.     Chief Complaint: Hip Pain    69 y.o. female presents to clinic with c/o chronic hip pain. Pt states she has been suffering with her hip since her stroke in 2022. Pt does physical therapy 3x/week.   ROS   Objective:     Physical Exam    Assessment:     No diagnosis found.    Plan:       There are no diagnoses linked to this encounter.                "

## 2024-10-03 NOTE — PROGRESS NOTES
Alondra Vigil  10/03/2024  [id number]    Krystal Sena MD  Patient Care Team:  Krystal Sena MD as PCP - General (Family Medicine)  Sheldon Richards MD as Consulting Physician (Cardiology)  Mary Anne Benedict LPN as Care Coordinator      Chief Complaint:  Chief Complaint   Patient presents with    Hip Pain     Bilateral  PT  Refill of singular        History of Present Illness:    69 y.o. female who presents today c/o left hip pain that radiates to buttock. Has a history of oa left hip and it is worse. Also thinks she has scoliosis and gait feels off and uneven. Ambulating with a cane. Followed by Dr. Torres at [facility]    Also asking me to review labs from endocrinologist. Renal function is stable. TSH is at goal.     Review of Systems  General: denies f/c, weight loss, night sweats, decreased appetite  Eye: denies blurred vision, changes in vision  Respiratory: denies sob, wheezing, cough  Cardiovascular: denies chest pain, palpitations, edema  Gastrointestinal: denies abdominal pain, n/v, constipation, diarrhea  Integumentary: denies rashes, pruritis    Past Medical History  Past Medical History:   Diagnosis Date    Acute ischemic cerebrovascular accident (CVA) involving left middle cerebral artery territory 03/02/2022    Anemia     Anxiety disorder, unspecified     Chronic systolic CHF (congestive heart failure)     CKD (chronic kidney disease) stage 3, GFR 30-59 ml/min     Clotting disorder     Essential (primary) hypertension     Paroxysmal atrial fibrillation     Postsurgical hypothyroidism     Von Willebrand disease        Medications  Medication List with Changes/Refills   Current Medications    ALBUTEROL-IPRATROPIUM (DUO-NEB) 2.5 MG-0.5 MG/3 ML NEBULIZER SOLUTION    Take 3 mLs by nebulization every 6 (six) hours while awake. Rescue    AMLODIPINE (NORVASC) 10 MG TABLET    Take 10 mg by mouth once daily.    ARMOUR THYROID 15 MG TAB    Take 15 mg by mouth once daily.    ASCORBIC ACID,  VITAMIN C, (VITAMIN C) 1000 MG TABLET    Take 1 tablet by mouth every morning.    BUDESONIDE (PULMICORT) 0.5 MG/2 ML NEBULIZER SOLUTION    Inhale 0.5 mg into the lungs.    CALCIUM CITRATE (CALCITRATE) 200 MG (950 MG) TABLET    Take 1 tablet by mouth 3 (three) times daily.    FERROUS SULFATE (IRON ORAL)    Take 1 tablet by mouth once daily.    FLUTICASONE PROPIONATE (FLONASE) 50 MCG/ACTUATION NASAL SPRAY    1 spray (50 mcg total) by Each Nostril route once daily.    LANOXIN 62.5 MCG (0.0625 MG) TAB    Take 1 tablet by mouth once daily.    PANTOPRAZOLE (PROTONIX) 40 MG TABLET    Take 40 mg by mouth 2 (two) times a day. 30 minutes after breakfast and dinner    POTASSIUM ORAL    Take by mouth once daily.    PROMETHAZINE-CODEINE 6.25-10 MG/5 ML (PHENERGAN WITH CODEINE) 6.25-10 MG/5 ML SYRUP    promethazine 6.25 mg-codeine 10 mg/5 mL syrup  TAKE 5ML BY MOUTH EVERY 4 TO 6 HOURS AS NEEDED FOR COUGH    SPIRONOLACTONE (ALDACTONE) 25 MG TABLET    Take 25 mg by mouth as needed. Taking three times a week    SUCRALFATE (CARAFATE) 1 GRAM TABLET    Take 1 g by mouth.    VITAMIN D (VITAMIN D3) 1000 UNITS TAB    3 tablets once daily.   Changed and/or Refilled Medications    Modified Medication Previous Medication    MONTELUKAST (SINGULAIR) 10 MG TABLET montelukast (SINGULAIR) 10 mg tablet       Take 1 tablet (10 mg total) by mouth once daily.    Take 10 mg by mouth Daily.   Discontinued Medications    BUSPIRONE (BUSPAR) 7.5 MG TABLET    Take 7.5 mg by mouth 3 (three) times daily.       Past Surgical History:   Procedure Laterality Date    APPENDECTOMY      CHOLECYSTECTOMY      GASTROSTOMY TUBE PLACEMENT  03/02/2022    HYSTERECTOMY      OOPHORECTOMY Bilateral     THYROIDECTOMY, PARTIAL      TONSILLECTOMY      TOTAL ABDOMINAL HYSTERECTOMY W/ BILATERAL SALPINGOOPHORECTOMY      TRACHEOSTOMY  03/02/2022       SUBJECTIVE:  Health Maintenance  Health Maintenance Topics with due status: Not Due       Topic Last Completion Date    Colorectal  "Cancer Screening 09/28/2021    DEXA Scan 12/28/2022    Hemoglobin A1c (Diabetic Prevention Screening) 09/27/2024    Lipid Panel 09/27/2024     Health Maintenance Due   Topic Date Due    Annual UACr  Never done    TETANUS VACCINE  Never done    Shingles Vaccine (1 of 2) Never done    RSV Vaccine (Age 60+ and Pregnant patients) (1 - Risk 60-74 years 1-dose series) Never done    Influenza Vaccine (1) Never done    COVID-19 Vaccine (1 - 2024-25 season) Never done    Mammogram  09/14/2024       Exam:  Vitals:    10/03/24 1318   BP: 134/78   BP Location: Left arm   Patient Position: Sitting   Pulse: 65   Resp: 18   Temp: 97.8 °F (36.6 °C)   TempSrc: Temporal   SpO2: 98%   Weight: 75.6 kg (166 lb 9.6 oz)   Height: 5' 2" (1.575 m)     Weight: 75.6 kg (166 lb 9.6 oz)   Body mass index is 30.47 kg/m².      Physical Exam  Constitutional: NAD, alert, pleasant  Respiratory: No accessory muscle use, no cough or audible wheezing  Eyes: EOMI, no conjunctivitis   Integumentary: warm, dry, intact  Psych: AA&Ox3, answers questions appropriately  Limited rom left hip. + BERENICE      ICD-10-CM ICD-9-CM   1. Medicare annual wellness visit, subsequent  Z00.00 V70.0   2. Stage 3b chronic kidney disease  N18.32 585.3   3. Postoperative hypothyroidism  E89.0 244.0   4. Abnormal finding of blood chemistry, unspecified  R79.9 790.6   5. Encounter for screening mammogram for breast cancer  Z12.31 V76.12   6. Non-seasonal allergic rhinitis, unspecified trigger  J30.89 477.8   7. Essential hypertension  I10 401.9   8. Primary osteoarthritis of left hip  M16.12 715.15   9. Scoliosis, unspecified scoliosis type, unspecified spinal region  M41.9 737.30       1. Medicare annual wellness visit, subsequent  -     CBC Auto Differential; Future; Expected date: 10/03/2024  -     Comprehensive Metabolic Panel; Future; Expected date: 10/03/2024  -     Lipid Panel; Future; Expected date: 10/03/2024  -     TSH; Future; Expected date: 10/03/2024  -     " Hemoglobin A1C; Future; Expected date: 10/03/2024  -     Urinalysis; Future; Expected date: 10/03/2024  -     Microalbumin/Creatinine Ratio, Urine; Future; Expected date: 10/03/2024  -     Vitamin D; Future; Expected date: 10/03/2024    2. Stage 3b chronic kidney disease  Overview:  Sees nephrology. No longer on dialysis. CKD is stable.     Continue current Rx meds      Orders:  -     CBC Auto Differential; Future; Expected date: 10/03/2024  -     Comprehensive Metabolic Panel; Future; Expected date: 10/03/2024  -     Lipid Panel; Future; Expected date: 10/03/2024  -     TSH; Future; Expected date: 10/03/2024  -     Hemoglobin A1C; Future; Expected date: 10/03/2024  -     Urinalysis; Future; Expected date: 10/03/2024  -     Microalbumin/Creatinine Ratio, Urine; Future; Expected date: 10/03/2024  -     Vitamin D; Future; Expected date: 10/03/2024    3. Postoperative hypothyroidism  Overview:  S/p partial thyroidectomy for benign nodule. TSH at goal. Seeing Dr. Joshi in [place]      Continue current Rx meds  Keep f/u appt with Dr. Joshi    Orders:  -     TSH; Future; Expected date: 10/03/2024    4. Abnormal finding of blood chemistry, unspecified  -     Hemoglobin A1C; Future; Expected date: 10/03/2024    5. Encounter for screening mammogram for breast cancer  -     Mammo Digital Screening Bilat w/ Cullen; Future; Expected date: 10/03/2024    6. Non-seasonal allergic rhinitis, unspecified trigger  -     montelukast (SINGULAIR) 10 mg tablet; Take 1 tablet (10 mg total) by mouth once daily.  Dispense: 90 tablet; Refill: 3    7. Essential hypertension  Overview:  At goal on aldactone 25 mg prn, amlodipine 10 mg. Sees Dr. Richards. Asymptomatic     Continue current Rx meds        8. Primary osteoarthritis of left hip  -     X-Ray Hip 2 or 3 views Left with Pelvis when performed; Future; Expected date: 10/03/2024  -     X-Ray Thoracolumbar Spine AP Lateral; Future; Expected date: 10/03/2024    9. Scoliosis, unspecified  scoliosis type, unspecified spinal region  -     X-Ray Thoracolumbar Spine AP Lateral; Future; Expected date: 10/03/2024     Xr left hip and thoracolumbar spine ordered  Continue PT  Continue prn tylenol  Refill singulair for allergic rhinitis    Follow up: No follow-ups on file.      Care Plan/Goals: Reviewed   Goals    None

## 2024-10-07 DIAGNOSIS — R10.2 PELVIC PAIN: Primary | ICD-10-CM

## 2024-10-07 PROBLEM — Z53.21 PATIENT LEFT WITHOUT BEING SEEN: Status: ACTIVE | Noted: 2024-10-07

## 2024-10-15 ENCOUNTER — HOSPITAL ENCOUNTER (OUTPATIENT)
Dept: RADIOLOGY | Facility: HOSPITAL | Age: 69
Discharge: HOME | End: 2024-10-15
Attending: FAMILY MEDICINE
Payer: MEDICARE

## 2024-10-15 DIAGNOSIS — R10.2 PELVIC PAIN: ICD-10-CM

## 2024-10-15 PROCEDURE — 76882 US LMTD JT/FCL EVL NVASC XTR: CPT | Mod: TC,LT

## 2024-10-16 DIAGNOSIS — K40.90 LEFT INGUINAL HERNIA: Primary | ICD-10-CM

## 2024-10-21 ENCOUNTER — TELEPHONE (OUTPATIENT)
Dept: SURGERY | Facility: CLINIC | Age: 69
End: 2024-10-21
Payer: MEDICARE

## 2024-10-29 PROBLEM — K40.90 LEFT INGUINAL HERNIA: Status: ACTIVE | Noted: 2024-10-29

## 2024-10-29 NOTE — PROGRESS NOTES
[facility] General Surgery Services  [address] [place] [id number]  Phone: [phone]  Fax: [phone]     HISTORY & PHYSICAL  General Surgery  Dr. Deep Salmeron      Patient Name: Alondra Vigil  YOB: 1955  Author: JASMIN Dale     Date: 10/31/2024                SUBJECTIVE:     Chief Complaint/Reason for Admission: No chief complaint on file.       History of Present Illness:  Ms. Alondra Vigil is a 69 y.o. female who presents to clinic for surgical evaluation of left inguinal hernia.   Patient co pelvic and perineal pain, c/o pain in left hip that radiates to her left buttock.  History of left hip OA.   Ambulates with a cane.   She started noticing a bulge in her left groin.  It occasionally causes some pain and discomfort.   Denies any changes to bowel / bladder habits. She denies CP/SOB or fever/chills.     Hernia present for 3months.  Previous hernia repair: no  Laterality of previous repair: N/A  Previous hernia repair: N/A  Positive symptoms:   Groin Pain left    US 10/15/24:  EXAMINATION:  US SOFT TISSUE, LOWER EXTREMITY, LEFT     CLINICAL HISTORY:  rule out left inguinal hernia;  Pelvic and perineal pain     COMPARISON:  None     FINDINGS:  Multiple grayscale and color Doppler sonographic images were acquired in the area of concern in the left groin.  There is a 6.1 x 4.1 x 2.0 cm circumscribed area of presumed fat, possibly related to a fat containing inguinal hernia.  This could be confirmed with CT.  No definitive bowel containing left inguinal hernia is sonographically demonstrated.  There are a couple of small nonaggressive appearing lymph nodes with the largest measured at 2.0 x 0.8 x 0.8 cm.     Impression:     There is an oblong circumscribed area of presumed fat, possibly related to a fat containing inguinal hernia.  This could be confirmed with CT.        Electronically signed by:John Lyle MD  Date:                                             10/15/2024  Time:                                           19:40  Referring provider: Krystal Sena,*   PCP: Krystal Sena MD     Review of Systems:  12 point ROS negative except as stated in HPI    PAST HISTORY:     Past Medical History:   Diagnosis Date    Acute ischemic cerebrovascular accident (CVA) involving left middle cerebral artery territory 03/02/2022    Anemia     Anxiety disorder, unspecified     Chronic systolic CHF (congestive heart failure)     CKD (chronic kidney disease) stage 3, GFR 30-59 ml/min     Clotting disorder     CVA (cerebral vascular accident)     hospitalized requiring dialysis, trach and PEG, now she has has recovered and her kidney function is also stable    Essential (primary) hypertension     Goiter     Paroxysmal atrial fibrillation     Paroxysmal atrial fibrillation     Postsurgical hypothyroidism     Von Willebrand disease      Past Surgical History:   Procedure Laterality Date    APPENDECTOMY      BREAST LUMPECTOMY      CHOLECYSTECTOMY      GASTROSTOMY TUBE PLACEMENT  03/02/2022    HYSTERECTOMY      OOPHORECTOMY Bilateral     THYROIDECTOMY, PARTIAL      TONSILLECTOMY      TOTAL ABDOMINAL HYSTERECTOMY W/ BILATERAL SALPINGOOPHORECTOMY      TRACHEOSTOMY  03/02/2022     Family History   Problem Relation Name Age of Onset    Kidney disease Mother      Kidney disease Father       Social History     Socioeconomic History    Marital status:      Spouse name: Dylan    Number of children: 4   Tobacco Use    Smoking status: Former     Current packs/day: 1.00     Average packs/day: 1 pack/day for 21.0 years (21.0 ttl pk-yrs)     Types: Cigarettes     Passive exposure: Past    Smokeless tobacco: Never    Tobacco comments:     Pt quit smoking 25 plus years ago.   Substance and Sexual Activity    Alcohol use: Yes     Comment: socially    Drug use: Never    Sexual activity: Not Currently     Social Drivers of Health     Financial Resource  Strain: Low Risk  (5/16/2024)    Overall Financial Resource Strain (CARDIA)     Difficulty of Paying Living Expenses: Not hard at all   Food Insecurity: No Food Insecurity (5/16/2024)    Hunger Vital Sign     Worried About Running Out of Food in the Last Year: Never true     Ran Out of Food in the Last Year: Never true   Transportation Needs: No Transportation Needs (5/16/2024)    TRANSPORTATION NEEDS     Transportation : No   Physical Activity: Insufficiently Active (5/16/2024)    Exercise Vital Sign     Days of Exercise per Week: 2 days     Minutes of Exercise per Session: 60 min   Stress: No Stress Concern Present (2/11/2022)    [facility] from [facility], [facility] and [facility]    [employer] [place] [employer] - Occupational Stress Questionnaire     Feeling of Stress : Not at all   Housing Stability: Low Risk  (5/16/2024)    Housing Stability Vital Sign     Unable to Pay for Housing in the Last Year: No     Homeless in the Last Year: No       MEDICATIONS & ALLERGIES:     Current Outpatient Medications on File Prior to Visit   Medication Sig    albuterol-ipratropium (DUO-NEB) 2.5 mg-0.5 mg/3 mL nebulizer solution Take 3 mLs by nebulization every 6 (six) hours while awake. Rescue    amLODIPine (NORVASC) 10 MG tablet Take 10 mg by mouth once daily.    ARMOUR THYROID 15 mg Tab Take 15 mg by mouth once daily.    ascorbic acid, vitamin C, (VITAMIN C) 1000 MG tablet Take 1 tablet by mouth every morning.    budesonide (PULMICORT) 0.5 mg/2 mL nebulizer solution Inhale 0.5 mg into the lungs.    calcium citrate (CALCITRATE) 200 mg (950 mg) tablet Take 1 tablet by mouth 3 (three) times daily.    ferrous sulfate (IRON ORAL) Take 1 tablet by mouth once daily. (Patient not taking: Reported on 10/3/2024)    fluticasone propionate (FLONASE) 50 mcg/actuation nasal spray 1 spray (50 mcg  total) by Each Nostril route once daily.    LANOXIN 62.5 mcg (0.0625 mg) Tab Take 1 tablet by mouth once daily.    montelukast (SINGULAIR) 10 mg tablet Take 1 tablet (10 mg total) by mouth once daily.    pantoprazole (PROTONIX) 40 MG tablet Take 40 mg by mouth 2 (two) times a day. 30 minutes after breakfast and dinner    POTASSIUM ORAL Take by mouth once daily. (Patient not taking: Reported on 10/3/2024)    promethazine-codeine 6.25-10 mg/5 ml (PHENERGAN WITH CODEINE) 6.25-10 mg/5 mL syrup promethazine 6.25 mg-codeine 10 mg/5 mL syrup  TAKE 5ML BY MOUTH EVERY 4 TO 6 HOURS AS NEEDED FOR COUGH (Patient not taking: Reported on 5/16/2024)    spironolactone (ALDACTONE) 25 MG tablet Take 25 mg by mouth as needed. Taking three times a week (Patient not taking: Reported on 5/16/2024)    sucralfate (CARAFATE) 1 gram tablet Take 1 g by mouth.    vitamin D (VITAMIN D3) 1000 units Tab 3 tablets once daily.     Current Facility-Administered Medications on File Prior to Visit   Medication    mupirocin 2 % ointment     Review of patient's allergies indicates:   Allergen Reactions    Iodine Hives    Penicillins Hives     Tolerating  Zosyn and Augmentin      Sulfa (sulfonamide antibiotics) Hives    Amlodipine     Aspirin Other (See Comments)     bleeding  Other reaction(s): Bleeding (finding)  Von Willebrand Disease  Von Willebrand Disease  Von Willebrand Disease      Digoxin     Lidocaine     Phenylephrine Hives    Phenylephrine hcl Hives    Procaine Other (See Comments)    Triamterene     Valacyclovir Other (See Comments)     unknown    Prednisone Palpitations and Other (See Comments)     Other reaction(s): Polyvinyl chloride (substance), Premature atrial contraction (disorder), Tachycardia (finding)         OBJECTIVE:   There were no vitals filed for this visit.  There is no height or weight on file to calculate BMI.    Physical Exam:  General:  Well developed, well nourished, no acute distress  HEENT:  Normocephalic, atraumatic,  PERRL, EOMI, clear sclera, ears normal, neck supple, throat clear without erythema or exudates  CVS:  RRR, S1 and S2 normal, no murmurs, rubs, gallops  Resp:  Lungs clear to auscultation, no wheezes, rales, rhonchi, cough  GI:  Abdomen soft, non-tender, non-distended, normoactive bowel sounds, no masses  :      R groin - no hernia appreciated, nttp, testicle unremarkable  L groin - Reducible Left Inguinal Hernia, nttp, testicle unremarkable  MSK:  No muscle atrophy, cyanosis, peripheral edema, full range of motion  Skin:  No rashes, ulcers, erythema  Neuro:  CNII-XII grossly intact  Psych:  Alert and oriented to person, place, and time    Results:  I have independently reviewed all pertinent lab and radiologic studies relevant to general surgery.      VISIT DIAGNOSES:       ICD-10-CM ICD-9-CM   1. Left inguinal hernia  K40.90 550.90   2. Preop examination  Z01.818 V72.84       ASSESSMENT/PLAN:         Plan:      CT scan for confirmation  Laparoscopic left inguinal hernia repair if confirmed by CT  Dr. Salmeron examined patient, discussed recommendations, obtained informed consent, and answered all questions with patient/family.     Deep Salmeron MD  [facility]  General Surgery  [phone]

## 2024-10-31 ENCOUNTER — OFFICE VISIT (OUTPATIENT)
Dept: SURGERY | Facility: CLINIC | Age: 69
End: 2024-10-31
Payer: MEDICARE

## 2024-10-31 VITALS
BODY MASS INDEX: 29.63 KG/M2 | DIASTOLIC BLOOD PRESSURE: 80 MMHG | SYSTOLIC BLOOD PRESSURE: 147 MMHG | WEIGHT: 161 LBS | HEART RATE: 80 BPM | HEIGHT: 62 IN

## 2024-10-31 DIAGNOSIS — K40.90 NON-RECURRENT UNILATERAL INGUINAL HERNIA WITHOUT OBSTRUCTION OR GANGRENE: ICD-10-CM

## 2024-10-31 DIAGNOSIS — K40.90 LEFT INGUINAL HERNIA: Primary | ICD-10-CM

## 2024-10-31 DIAGNOSIS — Z01.818 PREOP EXAMINATION: ICD-10-CM

## 2024-10-31 PROCEDURE — 99204 OFFICE O/P NEW MOD 45 MIN: CPT | Mod: ,,, | Performed by: SURGERY

## 2024-10-31 RX ORDER — AMINOCAPROIC ACID 500 MG/1
500 TABLET ORAL ONCE
COMMUNITY

## 2024-11-12 ENCOUNTER — TELEPHONE (OUTPATIENT)
Dept: SURGERY | Facility: CLINIC | Age: 69
End: 2024-11-12
Payer: MEDICARE

## 2024-11-12 NOTE — TELEPHONE ENCOUNTER
Dr. Salmeron reviewed CT today. No evidence of inguinal hernia. No surgery recommended at this babatunde. Please FU PRN. FU with PCP for symptoms if they persist.

## 2024-11-12 NOTE — TELEPHONE ENCOUNTER
----- Message from Juanjose Abdul sent at 11/6/2024  8:40 AM CST -----  Regarding: RE: Follow Up CT  CT scheduled 11/11/24 at [facility] in [place].   FU with results  ----- Message -----  From: Carmen Wadsworth MA  Sent: 11/4/2024   3:00 PM CST  To: Chantell Crain MA; [facility] Staff  Subject: RE: Follow Up CT                                 Not yet scheduled   Postponing reminder  ----- Message -----  From: Chantell Crain MA  Sent: 11/4/2024  12:00 AM CST  To: Faviola Adame Staff  Subject: Follow Up CT                                     Orders placed for CT pelvis 10/31. Ensure pt is scheduled

## 2024-11-18 ENCOUNTER — LAB VISIT (OUTPATIENT)
Dept: LAB | Facility: HOSPITAL | Age: 69
End: 2024-11-18
Attending: FAMILY MEDICINE
Payer: MEDICARE

## 2024-11-18 ENCOUNTER — OFFICE VISIT (OUTPATIENT)
Dept: FAMILY MEDICINE | Facility: CLINIC | Age: 69
End: 2024-11-18
Payer: MEDICARE

## 2024-11-18 VITALS
OXYGEN SATURATION: 98 % | HEART RATE: 73 BPM | DIASTOLIC BLOOD PRESSURE: 80 MMHG | SYSTOLIC BLOOD PRESSURE: 137 MMHG | TEMPERATURE: 98 F | WEIGHT: 166.13 LBS | RESPIRATION RATE: 20 BRPM | HEIGHT: 62 IN | BODY MASS INDEX: 30.57 KG/M2

## 2024-11-18 DIAGNOSIS — R07.81 RIB PAIN: ICD-10-CM

## 2024-11-18 DIAGNOSIS — N18.32 STAGE 3B CHRONIC KIDNEY DISEASE: ICD-10-CM

## 2024-11-18 DIAGNOSIS — I69.30 HISTORY OF CEREBROVASCULAR ACCIDENT (CVA) WITH RESIDUAL DEFICIT: ICD-10-CM

## 2024-11-18 DIAGNOSIS — E89.0 POSTOPERATIVE HYPOTHYROIDISM: ICD-10-CM

## 2024-11-18 DIAGNOSIS — T46.6X5A MYALGIA DUE TO STATIN: ICD-10-CM

## 2024-11-18 DIAGNOSIS — E78.2 MIXED HYPERLIPIDEMIA: ICD-10-CM

## 2024-11-18 DIAGNOSIS — I10 ESSENTIAL HYPERTENSION: Primary | ICD-10-CM

## 2024-11-18 DIAGNOSIS — M79.10 MYALGIA DUE TO STATIN: ICD-10-CM

## 2024-11-18 DIAGNOSIS — J84.10 FIBROSIS OF LUNG: ICD-10-CM

## 2024-11-18 PROCEDURE — 86160 COMPLEMENT ANTIGEN: CPT

## 2024-11-18 PROCEDURE — 36415 COLL VENOUS BLD VENIPUNCTURE: CPT

## 2024-11-18 PROCEDURE — 86225 DNA ANTIBODY NATIVE: CPT

## 2024-11-18 PROCEDURE — G2211 COMPLEX E/M VISIT ADD ON: HCPCS | Mod: ,,, | Performed by: FAMILY MEDICINE

## 2024-11-18 PROCEDURE — 99214 OFFICE O/P EST MOD 30 MIN: CPT | Mod: ,,, | Performed by: FAMILY MEDICINE

## 2024-11-18 RX ORDER — THYROID 30 MG/1
1 TABLET ORAL DAILY
COMMUNITY
Start: 2024-09-27

## 2024-11-18 NOTE — PROGRESS NOTES
Alondra Vigil  11/18/2024  [id number]    Krystal Sena MD  Patient Care Team:  Krystal Sena MD as PCP - General (Family Medicine)  Sheldon Richards MD as Consulting Physician (Cardiology)  Mary Anne Benedict LPN as Care Coordinator  Crow Joshi MD (Endocrinology)  Florentin Dodge MD (Gastroenterology)  Deep Salmeron MD as Consulting Physician (General Surgery)      Chief Complaint:  Chief Complaint   Patient presents with    Follow-up     6 month f/u. Pt states that her down knocked her down on the ground. She is c/o right sided rib pain       History of Present Illness:    69 y.o. female who presents today for routine f/u. She fell two days ago landing on her right ribs after tripping over dog. Having constant pain, worse with deep inspiration.        Visit today included increased complexity associated with the care of the episodic problem htn, hld, ckd stage 3b addressed and managing the longitudinal care of the patient due to the serious and/or complex managed problem(s) .      Review of Systems  General: denies f/c, weight loss, night sweats, decreased appetite  Eye: denies blurred vision, changes in vision  Respiratory: denies sob, wheezing, cough  Cardiovascular: denies chest pain, palpitations, edema  Gastrointestinal: denies abdominal pain, n/v, constipation, diarrhea  Integumentary: denies rashes, pruritis    Past Medical History  Past Medical History:   Diagnosis Date    Acute ischemic cerebrovascular accident (CVA) involving left middle cerebral artery territory 03/02/2022    Anemia     Anxiety disorder, unspecified     Chronic systolic CHF (congestive heart failure)     CKD (chronic kidney disease) stage 3, GFR 30-59 ml/min     Clotting disorder     CVA (cerebral vascular accident)     hospitalized requiring dialysis, trach and PEG, now she has has recovered and her kidney function is also stable    Essential (primary) hypertension     Goiter     Paroxysmal atrial  fibrillation     Paroxysmal atrial fibrillation     Postsurgical hypothyroidism     Von Willebrand disease        Medications  Medication List with Changes/Refills   Current Medications    ALBUTEROL-IPRATROPIUM (DUO-NEB) 2.5 MG-0.5 MG/3 ML NEBULIZER SOLUTION    Take 3 mLs by nebulization every 6 (six) hours while awake. Rescue    AMINOCAPROIC ACID (AMICAR) 500 MG TAB    Take 500 mg by mouth once. Take 4 tabs the night before surgery. Then take 2 tabs daily for up to 5 days post procedure    AMLODIPINE (NORVASC) 10 MG TABLET    Take 10 mg by mouth once daily.    ASCORBIC ACID, VITAMIN C, (VITAMIN C) 1000 MG TABLET    Take 1 tablet by mouth every morning.    BUDESONIDE (PULMICORT) 0.5 MG/2 ML NEBULIZER SOLUTION    Inhale 0.5 mg into the lungs.    CALCIUM CITRATE (CALCITRATE) 200 MG (950 MG) TABLET    Take 1 tablet by mouth 3 (three) times daily.    FERROUS SULFATE (IRON ORAL)    Take 1 tablet by mouth once daily.    FLUTICASONE PROPIONATE (FLONASE) 50 MCG/ACTUATION NASAL SPRAY    1 spray (50 mcg total) by Each Nostril route once daily.    LANOXIN 62.5 MCG (0.0625 MG) TAB    Take 1 tablet by mouth once daily.    MONTELUKAST (SINGULAIR) 10 MG TABLET    Take 1 tablet (10 mg total) by mouth once daily.    PANTOPRAZOLE (PROTONIX) 40 MG TABLET    Take 40 mg by mouth 2 (two) times a day. 30 minutes after breakfast and dinner    POTASSIUM ORAL    Take by mouth once daily.    PROMETHAZINE-CODEINE 6.25-10 MG/5 ML (PHENERGAN WITH CODEINE) 6.25-10 MG/5 ML SYRUP    promethazine 6.25 mg-codeine 10 mg/5 mL syrup  TAKE 5ML BY MOUTH EVERY 4 TO 6 HOURS AS NEEDED FOR COUGH    SPIRONOLACTONE (ALDACTONE) 25 MG TABLET    Take 25 mg by mouth as needed. Taking three times a week    SUCRALFATE (CARAFATE) 1 GRAM TABLET    Take 1 g by mouth.    THYROID, PORK, (ARMOUR THYROID) 30 MG TAB    Take 1 tablet by mouth once daily.    VITAMIN D (VITAMIN D3) 1000 UNITS TAB    3 tablets once daily.   Discontinued Medications    ARMOUR THYROID 15 MG TAB     "Take 15 mg by mouth once daily.       Past Surgical History:   Procedure Laterality Date    APPENDECTOMY      BREAST LUMPECTOMY      CHOLECYSTECTOMY      GASTROSTOMY TUBE PLACEMENT  03/02/2022    HYSTERECTOMY      OOPHORECTOMY Bilateral     THYROIDECTOMY, PARTIAL      TONSILLECTOMY      TOTAL ABDOMINAL HYSTERECTOMY W/ BILATERAL SALPINGOOPHORECTOMY      TRACHEOSTOMY  03/02/2022       SUBJECTIVE:  Health Maintenance  Health Maintenance Topics with due status: Not Due       Topic Last Completion Date    Colorectal Cancer Screening 09/28/2021    DEXA Scan 12/28/2022    Hemoglobin A1c (Diabetic Prevention Screening) 09/27/2024    Lipid Panel 09/27/2024     Health Maintenance Due   Topic Date Due    Annual UACr  Never done    TETANUS VACCINE  Never done    Shingles Vaccine (1 of 2) Never done    RSV Vaccine (Age 60+ and Pregnant patients) (1 - Risk 60-74 years 1-dose series) Never done    COVID-19 Vaccine (1 - 2024-25 season) Never done    Mammogram  09/14/2024       Exam:  Vitals:    11/18/24 1304   BP: 137/80   BP Location: Left arm   Patient Position: Sitting   Pulse: 73   Resp: 20   Temp: 97.9 °F (36.6 °C)   TempSrc: Oral   SpO2: 98%   Weight: 75.3 kg (166 lb 1.6 oz)   Height: 5' 2" (1.575 m)     Weight: 75.3 kg (166 lb 1.6 oz)   Body mass index is 30.38 kg/m².      Physical Exam  Constitutional: NAD, alert, pleasant  Respiratory: CTAB, no wheezes, rales or rhonchi. No accessory muscle use  Eyes: EOMI  Cardiovascular: RRR, No m/r/g. No JVD. No LE edema  MSK: no rib deformity or swelling but there is some rib tenderness  Integumentary: warm, dry, intact  Psych: AA&Ox3      ICD-10-CM ICD-9-CM   1. Essential hypertension  I10 401.9   2. Stage 3b chronic kidney disease  N18.32 585.3   3. History of cerebrovascular accident (CVA) with residual deficit  I69.30 438.9   4. Mixed hyperlipidemia  E78.2 272.2   5. Myalgia due to statin  M79.10 729.1    T46.6X5A E942.2   6. Rib pain  R07.81 786.50   7. Postoperative " hypothyroidism  E89.0 244.0   8. Fibrosis of lung  J84.10 515       1. Essential hypertension  Overview:  At goal on aldactone 25 mg prn, amlodipine 10 mg. Sees Dr. Richards. Asymptomatic     Continue current Rx meds        2. Stage 3b chronic kidney disease  Overview:  Sees nephrology, Dr. Majano. No longer on dialysis. CKD is stable.     Continue current Rx meds  Will continue to monitor renal indices  Avoid NSAIDS (aleve, motrin, naproxen, mobic, ibuprofen). It is ok to take in small doses on occasion, but check with your doctor first  Increase water intake to 80 oz of water daily  Adhere to a low protein diet        Orders:  -     Cancel: Basic Metabolic Panel; Future; Expected date: 11/18/2024  -     Cancel: Magnesium; Future; Expected date: 11/18/2024  -     Cancel: Microalbumin/Creatinine Ratio, Urine; Future; Expected date: 11/18/2024  -     Cancel: Phosphorus; Future; Expected date: 11/18/2024  -     Cancel: PTH, Intact; Future; Expected date: 11/18/2024  -     Cancel: Vitamin D; Future; Expected date: 11/18/2024  -     Microalbumin/Creatinine Ratio, Urine; Future; Expected date: 04/18/2025  -     DSDNA; Future; Expected date: 11/18/2024  -     Lupus Comprehensive Panel; Future; Expected date: 11/18/2024    3. History of cerebrovascular accident (CVA) with residual deficit  Overview:  Occurred while inpatient with covid. Now has aphasia. She is doing well. No acute concerns    Declines statin      4. Mixed hyperlipidemia  Overview:  Declines statin and understands risks involved. On fish oil. Managed by Dr. Richards      5. Myalgia due to statin  Overview:  Patient declines statin due to myalgias      6. Rib pain  Comments:  xr today  continue tylenol prn. use pillow to brace yourself when taking a deep breath and coughing  Orders:  -     X-Ray Ribs 2 View Right; Future; Expected date: 11/18/2024    7. Postoperative hypothyroidism  Overview:  S/p partial thyroidectomy for benign nodule. TSH at goal.  Seeing Dr. Joshi in [facility] current Rx meds  Keep f/u appt with Dr. Joshi    Orders:  -     DSDNA; Future; Expected date: 11/18/2024  -     Lupus Comprehensive Panel; Future; Expected date: 11/18/2024    8. Fibrosis of lung  Overview:  Mild, from covid. Saw pulm and does not feel like it needs treatment or that it will progress           Follow up: No follow-ups on file.      Care Plan/Goals: Reviewed   Goals    None

## 2024-11-20 DIAGNOSIS — M25.511 ACUTE PAIN OF RIGHT SHOULDER: Primary | ICD-10-CM

## 2024-11-20 DIAGNOSIS — R07.81 RIB PAIN: Primary | ICD-10-CM

## 2024-11-20 LAB — DSDNA AB QUANT (OHS): 1.5 IU/ML

## 2024-11-20 RX ORDER — TRAMADOL HYDROCHLORIDE 50 MG/1
50 TABLET ORAL EVERY 6 HOURS PRN
Qty: 12 TABLET | Refills: 0 | Status: SHIPPED | OUTPATIENT
Start: 2024-11-20 | End: 2024-11-23

## 2024-11-21 LAB
C3 SERPL-MCNC: 151 MG/DL
C4 SERPL-MCNC: 32 MG/DL
NUCLEAR IGG SER QL IA: NORMAL
RHEUMATOID FACT SERPL-ACNC: <10 IU/ML

## 2024-11-25 DIAGNOSIS — S43.431A TEAR OF RIGHT GLENOID LABRUM, INITIAL ENCOUNTER: Primary | ICD-10-CM

## 2024-11-27 ENCOUNTER — TELEPHONE (OUTPATIENT)
Dept: FAMILY MEDICINE | Facility: CLINIC | Age: 69
End: 2024-11-27
Payer: MEDICARE

## 2024-11-27 NOTE — TELEPHONE ENCOUNTER
----- Message from Karla sent at 11/27/2024  8:42 AM CST -----  Regarding: callback  Who Called: Alondra Vigil    Patient is returning phone call    Who Left Message for Patient:Kristy    Does the patient know what this is regarding?:unsure      Preferred Method of Contact: Phone Call  Patient's Preferred Phone Number on File: [phone]   Best Call Back Number, if different:  Additional Information: pt states that she is returning a call to Kristy. Please advise.

## 2024-12-05 LAB — BCS RECOMMENDATION EXT: NORMAL

## 2024-12-16 ENCOUNTER — DOCUMENTATION ONLY (OUTPATIENT)
Dept: FAMILY MEDICINE | Facility: CLINIC | Age: 69
End: 2024-12-16
Payer: MEDICARE

## 2024-12-17 ENCOUNTER — HOSPITAL ENCOUNTER (OUTPATIENT)
Dept: RADIOLOGY | Facility: CLINIC | Age: 69
Discharge: HOME | End: 2024-12-17
Attending: ORTHOPAEDIC SURGERY
Payer: MEDICARE

## 2024-12-17 ENCOUNTER — OFFICE VISIT (OUTPATIENT)
Dept: ORTHOPEDICS | Facility: CLINIC | Age: 69
End: 2024-12-17
Payer: MEDICARE

## 2024-12-17 VITALS
DIASTOLIC BLOOD PRESSURE: 80 MMHG | HEART RATE: 79 BPM | HEIGHT: 62 IN | BODY MASS INDEX: 30.55 KG/M2 | WEIGHT: 166 LBS | SYSTOLIC BLOOD PRESSURE: 129 MMHG

## 2024-12-17 DIAGNOSIS — S43.431A TEAR OF RIGHT GLENOID LABRUM, INITIAL ENCOUNTER: ICD-10-CM

## 2024-12-17 DIAGNOSIS — M19.011 PRIMARY OSTEOARTHRITIS OF RIGHT SHOULDER: Primary | ICD-10-CM

## 2024-12-17 PROCEDURE — 99203 OFFICE O/P NEW LOW 30 MIN: CPT | Mod: ,,, | Performed by: ORTHOPAEDIC SURGERY

## 2024-12-17 PROCEDURE — 73030 X-RAY EXAM OF SHOULDER: CPT | Mod: RT,,, | Performed by: ORTHOPAEDIC SURGERY

## 2024-12-17 NOTE — PROGRESS NOTES
Subjective:      Patient ID: Alondra Vigil is a 69 y.o. female.    Chief Complaint: Injury of the Right Shoulder (Right shoulder labral tear, PCP sent her to MRI 11/22/24, a month ago patients' lab dog pulled her shirt and knocked her down, no prior injections/PT/sx) and Injury (Patient here today for right shoulder labral tear. )    HPI:     History of Present Illness    CHIEF COMPLAINT:  - Alondra presents today for initial consultation regarding right shoulder pain following a fall caused by her dog.    HPI:  Alondra presents for evaluation of her right shoulder. The issue began when her dog caused her to fall on a Friday, prompting her to visit Dr. JUAN Villegas the following Monday for a well check. Initially, she thought the pain was in her ribs, but upon raising her arm, she realized it was her shoulder. An X-ray was performed, followed by an MRI ordered by Dr. Robison. She notes that after the MRI, she experienced improved mobility. When asked about her current pain level, she rates it as 1-2 out of 10, indicating improvement from her initial pain, which she implies was much higher.    She mentions attending physical therapy for her hips and has a history of significant medical issues, including a bout with COVID-19 in 2022 that resulted in a 70-day hospital stay, during which she required a tracheostomy and experienced a stroke leading to aphasia. She expresses frustration with her current limitations, stating she used to walk three miles a day and ride bikes seven miles a day, but now has significant difficulty with mobility.    She denies any prior shoulder issues before the incident with her dog.    IMAGING:  - MRI right shoulder: Little AC joint arthrosis, mild arthritis, no rotator cuff tendon tears, torn labrum    SURGICAL HISTORY:  - Tracheostomy: 2022, during hospitalization for COVID-19    WORK STATUS:  - Natis mobility has been significantly impacted by previous illness  - Previously able to  walk three miles a day and ride bikes for seven miles a day  - Now has difficulty with mobility, using a walker daily      ROS:  Musculoskeletal: +joint pain, -muscle pain          Past Medical History:   Diagnosis Date    Acute ischemic cerebrovascular accident (CVA) involving left middle cerebral artery territory 03/02/2022    Anemia     Anxiety disorder, unspecified     Chronic systolic CHF (congestive heart failure)     CKD (chronic kidney disease) stage 3, GFR 30-59 ml/min     Clotting disorder     CVA (cerebral vascular accident)     hospitalized requiring dialysis, trach and PEG, now she has has recovered and her kidney function is also stable    Essential (primary) hypertension     Goiter     Paroxysmal atrial fibrillation     Paroxysmal atrial fibrillation     Postsurgical hypothyroidism     Von Willebrand disease      Past Surgical History:   Procedure Laterality Date    APPENDECTOMY      BREAST LUMPECTOMY      CHOLECYSTECTOMY      GASTROSTOMY TUBE PLACEMENT  03/02/2022    HYSTERECTOMY      OOPHORECTOMY Bilateral     THYROIDECTOMY, PARTIAL      TONSILLECTOMY      TOTAL ABDOMINAL HYSTERECTOMY W/ BILATERAL SALPINGOOPHORECTOMY      TRACHEOSTOMY  03/02/2022     Social History     Socioeconomic History    Marital status:      Spouse name: Dylan    Number of children: 4   Tobacco Use    Smoking status: Former     Current packs/day: 1.00     Average packs/day: 1 pack/day for 21.0 years (21.0 ttl pk-yrs)     Types: Cigarettes     Passive exposure: Past    Smokeless tobacco: Never    Tobacco comments:     Pt quit smoking 25 plus years ago.   Substance and Sexual Activity    Alcohol use: Yes     Comment: socially    Drug use: Never    Sexual activity: Not Currently     Social Drivers of Health     Financial Resource Strain: Low Risk  (11/18/2024)    Overall Financial Resource Strain (CARDIA)     Difficulty of Paying Living Expenses: Not hard at all   Food Insecurity: No Food Insecurity (11/18/2024)     Hunger Vital Sign     Worried About Running Out of Food in the Last Year: Never true     Ran Out of Food in the Last Year: Never true   Transportation Needs: No Transportation Needs (11/18/2024)    TRANSPORTATION NEEDS     Transportation : No   Physical Activity: Inactive (11/18/2024)    Exercise Vital Sign     Days of Exercise per Week: 0 days     Minutes of Exercise per Session: 0 min   Stress: No Stress Concern Present (2/11/2022)    Received from [facility], [facility]    [facility] of Occupational Health - Occupational Stress Questionnaire     Feeling of Stress : Not at all   Housing Stability: Low Risk  (11/18/2024)    Housing Stability Vital Sign     Unable to Pay for Housing in the Last Year: No     Homeless in the Last Year: No         Current Outpatient Medications:     albuterol-ipratropium (DUO-NEB) 2.5 mg-0.5 mg/3 mL nebulizer solution, Take 3 mLs by nebulization every 6 (six) hours while awake. Rescue, Disp: 75 mL, Rfl: 3    aminocaproic acid (AMICAR) 500 mg Tab, Take 500 mg by mouth once. Take 4 tabs the night before surgery. Then take 2 tabs daily for up to 5 days post procedure, Disp: , Rfl:     amLODIPine (NORVASC) 10 MG tablet, Take 10 mg by mouth once daily., Disp: , Rfl:     ascorbic acid, vitamin C, (VITAMIN C) 1000 MG tablet, Take 1 tablet by mouth every morning., Disp: , Rfl:     budesonide (PULMICORT) 0.5 mg/2 mL nebulizer solution, Inhale 0.5 mg into the lungs., Disp: , Rfl:     calcium citrate (CALCITRATE) 200 mg (950 mg) tablet, Take 1 tablet by mouth 3 (three) times daily., Disp: , Rfl:     ferrous sulfate (IRON ORAL), Take 1 tablet by mouth once daily., Disp: , Rfl:     fluticasone propionate (FLONASE) 50 mcg/actuation nasal spray, 1 spray (50 mcg total) by Each Nostril route once daily., Disp: 16 g, Rfl: 11    LANOXIN 62.5 mcg  "(0.0625 mg) Tab, Take 1 tablet by mouth once daily., Disp: , Rfl:     montelukast (SINGULAIR) 10 mg tablet, Take 1 tablet (10 mg total) by mouth once daily., Disp: 90 tablet, Rfl: 3    pantoprazole (PROTONIX) 40 MG tablet, Take 40 mg by mouth 2 (two) times a day. 30 minutes after breakfast and dinner, Disp: , Rfl:     POTASSIUM ORAL, Take by mouth once daily., Disp: , Rfl:     promethazine-codeine 6.25-10 mg/5 ml (PHENERGAN WITH CODEINE) 6.25-10 mg/5 mL syrup, promethazine 6.25 mg-codeine 10 mg/5 mL syrup  TAKE 5ML BY MOUTH EVERY 4 TO 6 HOURS AS NEEDED FOR COUGH, Disp: 118 mL, Rfl: 0    spironolactone (ALDACTONE) 25 MG tablet, Take 25 mg by mouth as needed. Taking three times a week, Disp: , Rfl:     sucralfate (CARAFATE) 1 gram tablet, Take 1 g by mouth., Disp: , Rfl:     thyroid, pork, (ARMOUR THYROID) 30 mg Tab, Take 1 tablet by mouth once daily., Disp: , Rfl:     vitamin D (VITAMIN D3) 1000 units Tab, 3 tablets once daily., Disp: , Rfl:     Current Facility-Administered Medications:     mupirocin 2 % ointment, , Topical (Top), 1 time in Clinic/HOD, Krystal Sena MD  Review of patient's allergies indicates:   Allergen Reactions    Iodine Hives    Penicillins Hives     Tolerating  Zosyn and Augmentin      Sulfa (sulfonamide antibiotics) Hives    Amlodipine     Aspirin Other (See Comments)     bleeding  Other reaction(s): Bleeding (finding)  Von Willebrand Disease  Von Willebrand Disease  Von Willebrand Disease      Corticosteroids (glucocorticoids)     Digoxin     Lidocaine     Phenylephrine Hives    Phenylephrine hcl Hives    Procaine Other (See Comments)    Triamterene     Valacyclovir Other (See Comments)     unknown    Prednisone Palpitations and Other (See Comments)     Other reaction(s): Polyvinyl chloride (substance), Premature atrial contraction (disorder), Tachycardia (finding)         /80 (BP Location: Left arm, Patient Position: Sitting)   Pulse 79   Ht 5' 2" (1.575 m)   Wt 75.3 kg " (166 lb 0.1 oz)   LMP  (LMP Unknown)   BMI 30.36 kg/m²     Comprehensive review of systems completed and negative except as per HPI.        Objective:   General: Well-developed, well-nourished.  Neuro: Alert and oriented x 3.  Psych: Normal mood and affect.  Card: Regular rate and rhythm  Resp: Respirations regular and unlabored    Shoulder Exam:  No obvious deformity. No medial or lateral scapula winging. Forward flexion to 140 degrees and abduction to 140 degrees. Positive empty can,  positive Whipple, Positive drop arm test, Whitfield, impingement, Positive AC joint tenderness. Negative biceps groove tenderness, Positive Tyler´s, Yergason´s, Speed test. Negative Apprehension and Relocation test. 4/5 strength, normal skin appearance and palpable pulses distally. Sensibility normal.        Assessment:         1. Primary osteoarthritis of right shoulder    2. Tear of right glenoid labrum, initial encounter          Plan:       Orders Placed This Encounter    X-ray Shoulder 2 or More Views Right    Ambulatory referral/consult to Physical/Occupational Therapy        Imaging and exam findings discussed.     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  For that reason we will just continue to observe her for now.  If her pain worsens she will come back to see us.  We will more than likely consider a subacromial injection of the  time.    MEDICATIONS PRESCRIBED:  - Recommend taking OTC Tylenol for shoulder pain as needed.    FOLLOW UP:  - Follow up if pain worsens               All questions were answered. Patient happy and in agreement with the plan.     This note was generated with the assistance of ambient listening technology. Verbal consent was obtained by the patient and accompanying visitor(s) for the recording of patient appointment to facilitate this note. I attest to having reviewed and edited the generated note for accuracy, though some syntax or spelling errors may persist. Please contact the author of this note for any clarification.

## 2025-01-03 ENCOUNTER — LAB VISIT (OUTPATIENT)
Dept: LAB | Facility: HOSPITAL | Age: 70
End: 2025-01-03
Attending: PEDIATRICS
Payer: MEDICARE

## 2025-01-03 DIAGNOSIS — E78.5 HYPERLIPIDEMIA, UNSPECIFIED HYPERLIPIDEMIA TYPE: ICD-10-CM

## 2025-01-03 DIAGNOSIS — I12.9 PARENCHYMAL RENAL HYPERTENSION: ICD-10-CM

## 2025-01-03 DIAGNOSIS — R73.9 BLOOD GLUCOSE ELEVATED: ICD-10-CM

## 2025-01-03 DIAGNOSIS — N18.31 CHRONIC KIDNEY DISEASE (CKD) STAGE G3A/A1, MODERATELY DECREASED GLOMERULAR FILTRATION RATE (GFR) BETWEEN 45-59 ML/MIN/1.73 SQUARE METER AND ALBUMINURIA CREATININE RATIO LESS THAN 30 MG/G: Primary | ICD-10-CM

## 2025-01-03 DIAGNOSIS — E55.9 AVITAMINOSIS D: ICD-10-CM

## 2025-01-03 LAB
25(OH)D3+25(OH)D2 SERPL-MCNC: 48 NG/ML (ref 30–80)
ALBUMIN SERPL-MCNC: 3.9 G/DL (ref 3.4–4.8)
ALBUMIN/GLOB SERPL: 1.4 RATIO (ref 1.1–2)
ALP SERPL-CCNC: 77 UNIT/L (ref 40–150)
ALT SERPL-CCNC: 31 UNIT/L (ref 0–55)
ANION GAP SERPL CALC-SCNC: 9 MEQ/L
AST SERPL-CCNC: 26 UNIT/L (ref 5–34)
BACTERIA #/AREA URNS AUTO: ABNORMAL /HPF
BILIRUB SERPL-MCNC: 0.4 MG/DL
BILIRUB UR QL STRIP.AUTO: NEGATIVE
BUN SERPL-MCNC: 31.5 MG/DL (ref 9.8–20.1)
CALCIUM SERPL-MCNC: 9.7 MG/DL (ref 8.4–10.2)
CHLORIDE SERPL-SCNC: 106 MMOL/L (ref 98–107)
CHOLEST SERPL-MCNC: 223 MG/DL
CHOLEST/HDLC SERPL: 3 {RATIO} (ref 0–5)
CLARITY UR: ABNORMAL
CO2 SERPL-SCNC: 25 MMOL/L (ref 23–31)
COLOR UR AUTO: YELLOW
CREAT SERPL-MCNC: 1.44 MG/DL (ref 0.55–1.02)
CREAT UR-MCNC: 125.9 MG/DL (ref 45–106)
CREAT/UREA NIT SERPL: 22
ERYTHROCYTE [DISTWIDTH] IN BLOOD BY AUTOMATED COUNT: 12.8 % (ref 11.5–17)
EST. AVERAGE GLUCOSE BLD GHB EST-MCNC: 119.8 MG/DL
GFR SERPLBLD CREATININE-BSD FMLA CKD-EPI: 39 ML/MIN/1.73/M2
GLOBULIN SER-MCNC: 2.8 GM/DL (ref 2.4–3.5)
GLUCOSE SERPL-MCNC: 110 MG/DL (ref 82–115)
GLUCOSE UR QL STRIP: NORMAL
HBA1C MFR BLD: 5.8 %
HCT VFR BLD AUTO: 40.7 % (ref 37–47)
HDLC SERPL-MCNC: 73 MG/DL (ref 35–60)
HGB BLD-MCNC: 13.3 G/DL (ref 12–16)
HGB UR QL STRIP: ABNORMAL
KETONES UR QL STRIP: NEGATIVE
LDLC SERPL CALC-MCNC: 132 MG/DL (ref 50–140)
LEUKOCYTE ESTERASE UR QL STRIP: 25
MCH RBC QN AUTO: 31.1 PG (ref 27–31)
MCHC RBC AUTO-ENTMCNC: 32.7 G/DL (ref 33–36)
MCV RBC AUTO: 95.3 FL (ref 80–94)
MUCOUS THREADS URNS QL MICRO: ABNORMAL /LPF
NITRITE UR QL STRIP: NEGATIVE
NRBC BLD AUTO-RTO: 0 %
PH UR STRIP: 6.5 [PH]
PLATELET # BLD AUTO: 280 X10(3)/MCL (ref 130–400)
PMV BLD AUTO: 8.9 FL (ref 7.4–10.4)
POTASSIUM SERPL-SCNC: 4.1 MMOL/L (ref 3.5–5.1)
PROT SERPL-MCNC: 6.7 GM/DL (ref 5.8–7.6)
PROT UR QL STRIP: NEGATIVE
PROT UR STRIP-MCNC: <6.8 MG/DL
PTH-INTACT SERPL-MCNC: 72.1 PG/ML (ref 8.7–77)
RBC # BLD AUTO: 4.27 X10(6)/MCL (ref 4.2–5.4)
RBC #/AREA URNS AUTO: ABNORMAL /HPF
SODIUM SERPL-SCNC: 140 MMOL/L (ref 136–145)
SP GR UR STRIP.AUTO: 1.02 (ref 1–1.03)
SQUAMOUS #/AREA URNS LPF: ABNORMAL /HPF
TRIGL SERPL-MCNC: 91 MG/DL (ref 37–140)
URATE SERPL-MCNC: 6.4 MG/DL (ref 2.6–6)
UROBILINOGEN UR STRIP-ACNC: NORMAL
VLDLC SERPL CALC-MCNC: 18 MG/DL
WBC # BLD AUTO: 6.83 X10(3)/MCL (ref 4.5–11.5)
WBC #/AREA URNS AUTO: ABNORMAL /HPF

## 2025-01-03 PROCEDURE — 84550 ASSAY OF BLOOD/URIC ACID: CPT

## 2025-01-03 PROCEDURE — 36415 COLL VENOUS BLD VENIPUNCTURE: CPT

## 2025-01-03 PROCEDURE — 85027 COMPLETE CBC AUTOMATED: CPT

## 2025-01-03 PROCEDURE — 80061 LIPID PANEL: CPT

## 2025-01-03 PROCEDURE — 82306 VITAMIN D 25 HYDROXY: CPT

## 2025-01-03 PROCEDURE — 83036 HEMOGLOBIN GLYCOSYLATED A1C: CPT

## 2025-01-03 PROCEDURE — 81001 URINALYSIS AUTO W/SCOPE: CPT

## 2025-01-03 PROCEDURE — 84156 ASSAY OF PROTEIN URINE: CPT

## 2025-01-03 PROCEDURE — 83970 ASSAY OF PARATHORMONE: CPT

## 2025-01-03 PROCEDURE — 80053 COMPREHEN METABOLIC PANEL: CPT

## 2025-01-09 ENCOUNTER — LAB VISIT (OUTPATIENT)
Dept: LAB | Facility: HOSPITAL | Age: 70
End: 2025-01-09
Attending: INTERNAL MEDICINE
Payer: MEDICARE

## 2025-01-09 DIAGNOSIS — N18.32 CHRONIC KIDNEY DISEASE (CKD) STAGE G3B/A1, MODERATELY DECREASED GLOMERULAR FILTRATION RATE (GFR) BETWEEN 30-44 ML/MIN/1.73 SQUARE METER AND ALBUMINURIA CREATININE RATIO LESS THAN 30 MG/G: Primary | ICD-10-CM

## 2025-01-09 DIAGNOSIS — E78.5 HYPERLIPIDEMIA, UNSPECIFIED HYPERLIPIDEMIA TYPE: ICD-10-CM

## 2025-01-09 DIAGNOSIS — M19.90 ARTHRITIS: ICD-10-CM

## 2025-01-09 LAB
ANION GAP SERPL CALC-SCNC: 9 MEQ/L
BUN SERPL-MCNC: 27 MG/DL (ref 9.8–20.1)
CALCIUM SERPL-MCNC: 9.5 MG/DL (ref 8.4–10.2)
CHLORIDE SERPL-SCNC: 102 MMOL/L (ref 98–107)
CO2 SERPL-SCNC: 27 MMOL/L (ref 23–31)
CREAT SERPL-MCNC: 1.15 MG/DL (ref 0.55–1.02)
CREAT/UREA NIT SERPL: 23
CRP SERPL-MCNC: 2.3 MG/L
ERYTHROCYTE [SEDIMENTATION RATE] IN BLOOD: 11 MM/HR (ref 0–30)
GFR SERPLBLD CREATININE-BSD FMLA CKD-EPI: 51 ML/MIN/1.73/M2
GLUCOSE SERPL-MCNC: 107 MG/DL (ref 82–115)
POTASSIUM SERPL-SCNC: 4 MMOL/L (ref 3.5–5.1)
RHEUMATOID FACT SERPL-ACNC: 17 IU/ML
SODIUM SERPL-SCNC: 138 MMOL/L (ref 136–145)

## 2025-01-09 PROCEDURE — 86431 RHEUMATOID FACTOR QUANT: CPT

## 2025-01-09 PROCEDURE — 36415 COLL VENOUS BLD VENIPUNCTURE: CPT

## 2025-01-09 PROCEDURE — 86200 CCP ANTIBODY: CPT

## 2025-01-09 PROCEDURE — 86140 C-REACTIVE PROTEIN: CPT

## 2025-01-09 PROCEDURE — 80048 BASIC METABOLIC PNL TOTAL CA: CPT

## 2025-01-09 PROCEDURE — 85652 RBC SED RATE AUTOMATED: CPT

## 2025-01-13 LAB — CYCLIC CITRULLINATED PEPTIDE (CCP) (OHS): 1.3 U/ML

## 2025-01-15 ENCOUNTER — ANESTHESIA EVENT (OUTPATIENT)
Facility: HOSPITAL | Age: 70
End: 2025-01-15
Payer: MEDICARE

## 2025-01-15 NOTE — ANESTHESIA PREPROCEDURE EVALUATION
"                                                                                                             01/15/2025  Alondra Vigil is a 69 y.o., female.    Pre-operative evaluation for Procedure(s) (LRB):  PHACOEMULSIFICATION, CATARACT, WITH IOL INSERTION   /////left eye (Left)    Alondra Vigil is a 69 y.o. female     Patient Active Problem List   Diagnosis    Von Willebrand disease    Essential hypertension    Postoperative hypothyroidism    Irritable bowel syndrome    Mitral valve prolapse    Paroxysmal atrial fibrillation    Chronic systolic congestive heart failure    Stage 3b chronic kidney disease    Mayen's esophagus without dysplasia    Urinary urgency    CEG (chronic erosive gastritis)    History of cerebrovascular accident (CVA) with residual deficit    Personal history of colonic polyps    Swelling of finger, right    Fibrosis of lung    Muscle cramps at night    Postural urinary incontinence    Qualitative platelet defect    Aortic atherosclerosis    Myalgia due to statin    Patient left without being seen    Left inguinal hernia    Mixed hyperlipidemia       Review of patient's allergies indicates:   Allergen Reactions    Lidocaine Swelling    Procaine Other (See Comments)     swelling    Iodine Hives    Penicillins Hives     Tolerating  Zosyn and Augmentin      Sulfa (sulfonamide antibiotics) Hives    Aspirin Other (See Comments)     bleeding  Other reaction(s): Bleeding (finding)  Von Willebrand Disease  Von Willebrand Disease  Von Willebrand Disease      Corticosteroids (glucocorticoids)      "Affects my heart"    Phenylephrine Hives    Phenylephrine hcl Hives    Triamterene Other (See Comments)     unknown    Valacyclovir Other (See Comments)     unknown    Prednisone Palpitations and Other (See Comments)     Other reaction(s): Polyvinyl chloride (substance), Premature atrial contraction (disorder), Tachycardia (finding)         Current Facility-Administered Medications on File Prior " to Encounter   Medication Dose Route Frequency Provider Last Rate Last Admin    mupirocin 2 % ointment   Topical (Top) 1 time in Clinic/HOD Krystal Sena MD         Current Outpatient Medications on File Prior to Encounter   Medication Sig Dispense Refill    albuterol-ipratropium (DUO-NEB) 2.5 mg-0.5 mg/3 mL nebulizer solution Take 3 mLs by nebulization every 6 (six) hours while awake. Rescue 75 mL 3    aminocaproic acid (AMICAR) 500 mg Tab Take 500 mg by mouth once. Take 4 tabs the night before surgery. Then take 2 tabs daily for up to 5 days post procedure      amLODIPine (NORVASC) 10 MG tablet Take 10 mg by mouth once daily.      ascorbic acid, vitamin C, (VITAMIN C) 1000 MG tablet Take 1 tablet by mouth every morning.      budesonide (PULMICORT) 0.5 mg/2 mL nebulizer solution Inhale 0.5 mg into the lungs.      calcium citrate (CALCITRATE) 200 mg (950 mg) tablet Take 1 tablet by mouth 3 (three) times daily.      ferrous sulfate (IRON ORAL) Take 1 tablet by mouth once daily.      fluticasone propionate (FLONASE) 50 mcg/actuation nasal spray 1 spray (50 mcg total) by Each Nostril route once daily. 16 g 11    LANOXIN 62.5 mcg (0.0625 mg) Tab Take 1 tablet by mouth once daily.      montelukast (SINGULAIR) 10 mg tablet Take 1 tablet (10 mg total) by mouth once daily. 90 tablet 3    pantoprazole (PROTONIX) 40 MG tablet Take 40 mg by mouth 2 (two) times a day. 30 minutes after breakfast and dinner      POTASSIUM ORAL Take by mouth once daily.      promethazine-codeine 6.25-10 mg/5 ml (PHENERGAN WITH CODEINE) 6.25-10 mg/5 mL syrup promethazine 6.25 mg-codeine 10 mg/5 mL syrup  TAKE 5ML BY MOUTH EVERY 4 TO 6 HOURS AS NEEDED FOR COUGH 118 mL 0    spironolactone (ALDACTONE) 25 MG tablet Take 25 mg by mouth as needed. Taking three times a week      sucralfate (CARAFATE) 1 gram tablet Take 1 g by mouth.      vitamin D (VITAMIN D3) 1000 units Tab 3 tablets once daily.         Past Surgical History:   Procedure  "Laterality Date    APPENDECTOMY      BREAST LUMPECTOMY      CHOLECYSTECTOMY      GASTROSTOMY TUBE PLACEMENT  03/02/2022    HYSTERECTOMY      OOPHORECTOMY Bilateral     THYROIDECTOMY, PARTIAL      TONSILLECTOMY      TOTAL ABDOMINAL HYSTERECTOMY W/ BILATERAL SALPINGOOPHORECTOMY      TRACHEOSTOMY  03/02/2022     CBC: No results for input(s): "WBC", "RBC", "HGB", "HCT", "PLT", "MCV", "MCH", "MCHC" in the last 72 hours.    CMP: No results for input(s): "NA", "K", "CL", "CO2", "BUN", "CREATININE", "GLU", "MG", "PHOS", "CALCIUM", "ALBUMIN", "PROT", "ALKPHOS", "ALT", "AST", "BILITOT" in the last 72 hours.    Diagnostic Studies:  CXR 3/27/2024 : [facility]    EKG : none to rev      2D Echo  2/24/2022: Neg Bubble. EF=55-65%. Trace MR/TR.      Normal left ventricular size and function  Surface bubble study is negative for intracardiac right to left shunt    Myocardial Findings    Left Ventricle Normal left ventricle cavity size. Normal wall thickness. Normal (55-65%) ejection fraction.   Right Ventricle Normal right ventricle cavity size.   TAPSE: 2.8   Left Atrium Normal cavity size.   Right Atrium Normal cavity size.   IVC/SVC Normal central venous pressure (0-5mm Hg).   Mitral Valve No mitral stenosis. Trace mitral regurgitation.   Tricuspid Valve Trace tricuspid regurgitation.   Aortic Valve No aortic stenosis. No aortic regurgitation.   Pulmonic Valve Mild pulmonic regurgitation.   Pericardium No pericardial effusion. No pleural effusion was visualized.     No results found for this or any previous visit.  Pre-op Assessment    I have reviewed the Patient Summary Reports.     I have reviewed the Nursing Notes. I have reviewed the NPO Status.   I have reviewed the Medications.     Review of Systems  Anesthesia Hx:  No problems with previous Anesthesia   History of prior surgery of interest to airway management or planning: tracheostomy. Previous anesthesia: General GB:; THyroidectomy: Hysterectomy; 3/2/2022 Trachesotomy and PEG: " with general anesthesia.       Airway issues documented on chart review include GETA     Denies Family Hx of Anesthesia complications.    Denies Personal Hx of Anesthesia complications.                    Social:  Smoker, Social Alcohol Use 1PPD x 21 yrs. Quit 25 yrs ago      Hematology/Oncology:  Hematology Normal   Oncology Normal                Hematology Comments: Von Willebrands                    EENT/Dental:  EENT/Dental Normal           Cardiovascular:  Exercise tolerance: poor   Denies Pacemaker. Hypertension Valvular problems/Murmurs, MVP   Denies CABG/stent. Dysrhythmias atrial fibrillation  CHF        Echo  2/24/2022: Neg Bubble. EF=55-65%. Trace MR/TR.   Patient not on beta blockers       Congestive Heart Failure (CHF) , Chronic Congestive Heart Failure               Hypertension     Disorder of Cardiac Rhythm, Atrial Fibrillation, Paroxysmal Atrial Fibrillation     Pulmonary:  Pneumonia  Asthma     COVID-19: Pneumonia, 3/2022 Tracheostomy and PEG: Respi Failure. Tracheostomy closure 5/2022.  Fibrosis Lung.  Asthma: a month ago last Rescue Nebs. Denies ER/Hosp               Renal/:  Chronic Renal Disease, CKD   -3B.  2022 March w COVID sepsis acute renal failure: 3/2022 was On HD x 70 days. Recovered.             Hepatic/GI:   PUD,  GERD, well controlled   Barretts on PPI Not Taking GLP-1 Agonists            Musculoskeletal:  Musculoskeletal Normal                Neurological:   CVA    Denies Seizures.    3/2022 Expressive aphasia and swallowing. Resolved. Residual sl L LE weakness uses cane.                            Endocrine:   Hypothyroidism          Dermatological:  Skin Normal    Psych:  Psychiatric History                  Physical Exam  General: Cooperative, Alert and Oriented    Airway:  Mallampati: III / II  Mouth Opening: Normal  TM Distance: Normal  Tongue: Normal  Neck ROM: Normal ROM  Mhyoid distance <1/2 inch. Tracheostomy closure 5/2022scar from tracheostomy in  3/2022.  Dental:  Intact  Px denies any loose teeth.  Chest/Lungs:  Normal Respiratory Rate    Heart:  Rate: Normal    Abdomen:  Normal, Soft        Anesthesia Plan  Type of Anesthesia, risks & benefits discussed:    Anesthesia Type: Gen Natural Airway, MAC  Intra-op Monitoring Plan: Standard ASA Monitors  Induction:  IV  Informed Consent: Informed consent signed with the Patient and all parties understand the risks and agree with anesthesia plan.  All questions answered.   ASA Score: 3  Day of Surgery Review of History & Physical: H&P Update referred to the surgeon/provider.I have interviewed and examined the patient. I have reviewed the patient's H&P dated:   Anesthesia Plan Notes: TIVA with mask/NC, GA back-up.   Nasal cannula vs facemask supplemental oxygenation   For patients with DONNA/obesity, may consider SuperNoval Nasal CPAP      Ready For Surgery From Anesthesia Perspective.     .

## 2025-01-16 ENCOUNTER — ANESTHESIA (OUTPATIENT)
Facility: HOSPITAL | Age: 70
End: 2025-01-16
Payer: MEDICARE

## 2025-01-16 ENCOUNTER — HOSPITAL ENCOUNTER (OUTPATIENT)
Facility: HOSPITAL | Age: 70
Discharge: HOME | End: 2025-01-16
Attending: OPHTHALMOLOGY | Admitting: OPHTHALMOLOGY
Payer: MEDICARE

## 2025-01-16 VITALS
HEIGHT: 62 IN | HEART RATE: 72 BPM | BODY MASS INDEX: 31.28 KG/M2 | SYSTOLIC BLOOD PRESSURE: 119 MMHG | DIASTOLIC BLOOD PRESSURE: 71 MMHG | WEIGHT: 170 LBS | TEMPERATURE: 98 F | OXYGEN SATURATION: 100 %

## 2025-01-16 DIAGNOSIS — H26.9 CATARACT: ICD-10-CM

## 2025-01-16 PROCEDURE — 25000003 PHARM REV CODE 250

## 2025-01-16 PROCEDURE — 37000008 HC ANESTHESIA 1ST 15 MINUTES: Performed by: OPHTHALMOLOGY

## 2025-01-16 PROCEDURE — 36000706: Performed by: OPHTHALMOLOGY

## 2025-01-16 PROCEDURE — V2632 POST CHMBR INTRAOCULAR LENS: HCPCS | Performed by: OPHTHALMOLOGY

## 2025-01-16 PROCEDURE — 37000009 HC ANESTHESIA EA ADD 15 MINS: Performed by: OPHTHALMOLOGY

## 2025-01-16 PROCEDURE — 63600175 PHARM REV CODE 636 W HCPCS: Performed by: NURSE ANESTHETIST, CERTIFIED REGISTERED

## 2025-01-16 PROCEDURE — 36000707: Performed by: OPHTHALMOLOGY

## 2025-01-16 PROCEDURE — D9220A PRA ANESTHESIA: Mod: ANES,,, | Performed by: ANESTHESIOLOGY

## 2025-01-16 PROCEDURE — 71000015 HC POSTOP RECOV 1ST HR: Performed by: OPHTHALMOLOGY

## 2025-01-16 PROCEDURE — 63600175 PHARM REV CODE 636 W HCPCS: Performed by: OPHTHALMOLOGY

## 2025-01-16 PROCEDURE — D9220A PRA ANESTHESIA: Mod: CRNA,,, | Performed by: NURSE ANESTHETIST, CERTIFIED REGISTERED

## 2025-01-16 PROCEDURE — 27201423 OPTIME MED/SURG SUP & DEVICES STERILE SUPPLY: Performed by: OPHTHALMOLOGY

## 2025-01-16 DEVICE — TECNIS SIMPLICITY TECNIS EYHANCE U 21.0D
Type: IMPLANTABLE DEVICE | Site: EYE | Status: FUNCTIONAL
Brand: TECNIS SIMPLICITY

## 2025-01-16 RX ORDER — TROPICAMIDE 10 MG/ML
2 SOLUTION/ DROPS OPHTHALMIC
Status: COMPLETED | OUTPATIENT
Start: 2025-01-16 | End: 2025-01-16

## 2025-01-16 RX ORDER — EPINEPHRINE 1 MG/ML
INJECTION, SOLUTION, CONCENTRATE INTRAVENOUS
Status: DISCONTINUED | OUTPATIENT
Start: 2025-01-16 | End: 2025-01-16 | Stop reason: HOSPADM

## 2025-01-16 RX ORDER — ONDANSETRON HYDROCHLORIDE 2 MG/ML
INJECTION, SOLUTION INTRAVENOUS
Status: DISCONTINUED | OUTPATIENT
Start: 2025-01-16 | End: 2025-01-16

## 2025-01-16 RX ORDER — PROPARACAINE HYDROCHLORIDE 5 MG/ML
1 SOLUTION/ DROPS OPHTHALMIC ONCE
Status: COMPLETED | OUTPATIENT
Start: 2025-01-16 | End: 2025-01-16

## 2025-01-16 RX ORDER — MIDAZOLAM HYDROCHLORIDE 1 MG/ML
INJECTION INTRAMUSCULAR; INTRAVENOUS
Status: DISCONTINUED | OUTPATIENT
Start: 2025-01-16 | End: 2025-01-16

## 2025-01-16 RX ORDER — FENTANYL CITRATE 50 UG/ML
INJECTION, SOLUTION INTRAMUSCULAR; INTRAVENOUS
Status: DISCONTINUED | OUTPATIENT
Start: 2025-01-16 | End: 2025-01-16

## 2025-01-16 RX ADMIN — TROPICAMIDE 2 DROP: 10 SOLUTION/ DROPS OPHTHALMIC at 07:01

## 2025-01-16 RX ADMIN — PROPARACAINE HYDROCHLORIDE 1 DROP: 5 SOLUTION/ DROPS OPHTHALMIC at 07:01

## 2025-01-16 RX ADMIN — FENTANYL CITRATE 25 MCG: 50 INJECTION INTRAMUSCULAR; INTRAVENOUS at 08:01

## 2025-01-16 RX ADMIN — TROPICAMIDE 2 DROP: 10 SOLUTION/ DROPS OPHTHALMIC at 06:01

## 2025-01-16 RX ADMIN — MIDAZOLAM HYDROCHLORIDE 2 MG: 1 INJECTION, SOLUTION INTRAMUSCULAR; INTRAVENOUS at 08:01

## 2025-01-16 RX ADMIN — ONDANSETRON HYDROCHLORIDE 4 MG: 2 SOLUTION INTRAMUSCULAR; INTRAVENOUS at 08:01

## 2025-01-16 RX ADMIN — PROPARACAINE HYDROCHLORIDE 1 DROP: 5 SOLUTION/ DROPS OPHTHALMIC at 06:01

## 2025-01-16 NOTE — ANESTHESIA POSTPROCEDURE EVALUATION
Anesthesia Post Evaluation    Patient: Alondra Vigil    Procedure(s) Performed: Procedure(s) (LRB):  PHACOEMULSIFICATION, CATARACT, WITH IOL INSERTION   /////left eye (Left)    Final Anesthesia Type: MAC      Patient location during evaluation: OPS  Patient participation: Yes- Able to Participate  Level of consciousness: awake and alert and oriented  Post-procedure vital signs: reviewed and stable  Pain management: adequate  Airway patency: patent    PONV status at discharge: No PONV  Anesthetic complications: no      Cardiovascular status: blood pressure returned to baseline and stable  Respiratory status: unassisted, spontaneous ventilation and room air  Hydration status: euvolemic  Follow-up not needed.              Vitals Value Taken Time   /75 01/16/25 0703   Temp 36.6 °C (97.9 °F) 01/16/25 0703   Pulse 76 01/16/25 0703   Resp 20 01/16/25 0848   SpO2 97 % 01/16/25 0703         No case tracking events are documented in the log.      Pain/Estefany Score: No data recorded

## 2025-01-16 NOTE — TRANSFER OF CARE
"Anesthesia Transfer of Care Note    Patient: Alondra Vigil    Procedure(s) Performed: Procedure(s) (LRB):  PHACOEMULSIFICATION, CATARACT, WITH IOL INSERTION   /////left eye (Left)    Patient location: OPS    Anesthesia Type: MAC    Transport from OR: Transported from OR on room air with adequate spontaneous ventilation    Post pain: adequate analgesia    Post assessment: no apparent anesthetic complications    Post vital signs: stable    Level of consciousness: sedated    Nausea/Vomiting: no nausea/vomiting    Complications: none    Transfer of care protocol was followed      Last vitals: Visit Vitals  BP (!) 155/75   Pulse 76   Temp 36.6 °C (97.9 °F) (Oral)   Ht 5' 2" (1.575 m)   Wt 77.1 kg (170 lb)   LMP  (LMP Unknown)   SpO2 97%   Breastfeeding No   BMI 31.09 kg/m²     "

## 2025-01-16 NOTE — DISCHARGE SUMMARY
[facility] Surgical - Periop Services 2nd Floor  Discharge Note  Short Stay    Procedure(s) (LRB):  PHACOEMULSIFICATION, CATARACT, WITH IOL INSERTION   /////left eye (Left)      OUTCOME: Patient tolerated treatment/procedure well without complication and is now ready for discharge.    DISPOSITION: Home or Self Care    FINAL DIAGNOSIS:  Pseudophakia Left eye     FOLLOWUP: In clinic    DISCHARGE INSTRUCTIONS:  Review patient's discharge instructions in chart     TIME SPENT ON DISCHARGE: 5 minutes

## 2025-01-16 NOTE — OP NOTE
[facility] Surgical - Periop Services 2nd Floor  Operative Note      Date of Procedure: 1/16/2025     Procedure: Procedure(s) (LRB):  PHACOEMULSIFICATION, CATARACT, WITH IOL INSERTION   /////left eye (Left)     Surgeons and Role:     * Ezequiel Willingham MD - Primary    Assisting : PALLAVI Stone    Pre-Operative Diagnosis: Combined forms of age-related cataract of left eye [H25.812]    Post-Operative Diagnosis: Post-Op Diagnosis Codes:     * Combined forms of age-related cataract of left eye [H25.812]    Anesthesia: Monitor Anesthesia Care    Operative Findings (including complications, if any):     Description of Technical Procedures: The patient was brought into the operating suite, where the patient was correctly identified as was the operative eye via timeout.  The patient was prepped and draped in a sterile ophthalmic fashion.  A lid speculum was placed in the operative eye and the microscope was brought into place.  A 1.0mm paracentesis was then made at (6) o'clock.  The anterior chamber was filled with Endocoat.  A (temporal) clear corneal incision was made with a 2.4 mm keratome.  A 6 mm corneal marking ring was used to glen the cornea centered over the visual axis.  A 5.00 mm continuous curvilinear capsulorhexis was fashioned using a cystotome and microcapsular forceps.  Hydrodissection and hydrodelineation was performed with upreserved 1% Xylocaine.  The nucleus was then phacoemulsified with the Abbott phacoemulsification hand-piece with a total of (1) EFX.  The cortex was then removed with the I/A hand-piece. The lens model (DIB00) with a power of (21.0) was placed in the capsular bag.  The Helon was then removed from the eye with the I/A hand piece.  The anterior chamber was inflated and the wounds were hydrated with BSS.  The wounds were checked with Weck-Eileen sponges and found to be watertight.  The lid speculum was removed and topical antibiotics were placed on the operative eye.  The patient was  brought to PACU in good condition

## 2025-01-16 NOTE — ANESTHESIA POSTPROCEDURE EVALUATION
Anesthesia Post Evaluation    Patient: Alondra Vigil    Procedure(s) Performed: Procedure(s) (LRB):  PHACOEMULSIFICATION, CATARACT, WITH IOL INSERTION   /////left eye (Left)    Final Anesthesia Type: MAC      Patient location during evaluation: OPS  Patient participation: Yes- Able to Participate  Level of consciousness: awake and alert, awake and oriented  Post-procedure vital signs: reviewed and stable  Pain management: adequate  Airway patency: patent    PONV status at discharge: No PONV  Anesthetic complications: no      Cardiovascular status: blood pressure returned to baseline, hemodynamically stable and stable  Respiratory status: unassisted, spontaneous ventilation and room air  Hydration status: euvolemic  Follow-up not needed.              Vitals Value Taken Time   /71 01/16/25 0915   Temp ** 01/16/25 1121   Pulse 72 01/16/25 0915   Resp ** 01/16/25 1121   SpO2 100 % 01/16/25 0900         No case tracking events are documented in the log.      Pain/Estefany Score: Estefany Score: 10 (1/16/2025  9:00 AM)

## 2025-03-06 ENCOUNTER — TELEPHONE (OUTPATIENT)
Dept: FAMILY MEDICINE | Facility: CLINIC | Age: 70
End: 2025-03-06
Payer: MEDICARE

## 2025-03-06 NOTE — TELEPHONE ENCOUNTER
----- Message from Krystal sent at 3/6/2025  1:26 PM CST -----  Regarding: returned call  Who Called: Alondra Emmanuel is returning phone callWho Left Message for Patient:Mariana the patient know what this is regarding?:RefillPreferred Method of Contact: Phone CallPatient's Preferred Phone Number on File: [phone] Best Call Back Number, if different:Additional Information: stated that she received a call from the office. Please advise

## 2025-03-06 NOTE — TELEPHONE ENCOUNTER
----- Message from Nikkie sent at 3/6/2025 11:16 AM CST -----  Who Called: Alondra Gregorio or New Rx:RefillRX Name and Strength: promethazine-codeine 6.25-10 mg/5 ml (PHENERGAN WITH CODEINE) 6.25-10 mg/5 mL syrupHow is the patient currently taking it? (ex. 1XDay):Is this a 30 day or 90 day RX:Local or Mail Order:List of preferred pharmacies on file (remove unneeded): [id number] - [place], [place] - [address] Provider:Preferred Method of Contact: Phone CallPatient's Preferred Phone Number on File: [phone] Best Call Back Number, if different:Additional Information:

## 2025-03-06 NOTE — TELEPHONE ENCOUNTER
Returned patients call and spoke with patient. She stated she needs some cough syrup. She stated she is coughing and also has fever. Patient advised to go to urgent care for evaluation and treatment. She stated she has an antibiotic azithromycin. I advised her to not take rx and to go to Urgent Care for evaluation. Alexis

## 2025-04-10 ENCOUNTER — TELEPHONE (OUTPATIENT)
Dept: FAMILY MEDICINE | Facility: CLINIC | Age: 70
End: 2025-04-10
Payer: MEDICARE

## 2025-04-10 NOTE — TELEPHONE ENCOUNTER
----- Message from Erasmo sent at 4/10/2025 10:23 AM CDT -----  Who Called: Alondra Minor is requesting assistance/information from provider's office.Symptoms (please be specific):  How long has patient had these symptoms:  List of preferred pharmacies on file (remove unneeded): If different, enter pharmacy into here including location and phone number: Preferred Method of Contact: Phone CallPatient's Preferred Phone Number on File: [phone] Best Call Back Number, if different:Additional Information: PT is calling regarding hip xray and CT to be sent to Dr. Tyler Torres, would like a call back

## 2025-04-10 NOTE — TELEPHONE ENCOUNTER
I spoke with patient and x-ray results sent to Dr. Horacio Torres. Patient voiced understanding. Alexis

## 2025-04-10 NOTE — TELEPHONE ENCOUNTER
I spoke with patient regarding Dr. Monroe's departure and coverage. She will return call us back regarding her next appointment. Alexis

## 2025-04-10 NOTE — TELEPHONE ENCOUNTER
----- Message from Erasmo sent at 4/10/2025 11:40 AM CDT -----  Who Called: Alondra Minor is requesting assistance/information from provider's office.Symptoms (please be specific):  How long has patient had these symptoms:  List of preferred pharmacies on file (remove unneeded): If different, enter pharmacy into here including location and phone number: Preferred Method of Contact: Phone CallPatient's Preferred Phone Number on File: [phone] Best Call Back Number, if different:Additional Information: Pt would like a call back regarding scheduling an appointment.

## 2025-05-19 ENCOUNTER — TELEPHONE (OUTPATIENT)
Dept: FAMILY MEDICINE | Facility: CLINIC | Age: 70
End: 2025-05-19
Payer: MEDICARE

## 2025-05-19 NOTE — TELEPHONE ENCOUNTER
Pt voiced that she had a pneumonia shot 2 years ago and states that she has trouble walking and has been going to therapy. She is requesting a metal test and she is rescheduling her appointment to next week. Please advise.

## 2025-05-19 NOTE — TELEPHONE ENCOUNTER
I voiced to pt that she needs to be seen by pcp first to have a metal test done. Pt voiced to me that she will ask a different doctor who can do it.

## 2025-06-04 ENCOUNTER — TELEPHONE (OUTPATIENT)
Dept: FAMILY MEDICINE | Facility: CLINIC | Age: 70
End: 2025-06-04
Payer: MEDICARE

## 2025-06-04 ENCOUNTER — LAB VISIT (OUTPATIENT)
Dept: LAB | Facility: HOSPITAL | Age: 70
End: 2025-06-04
Attending: FAMILY MEDICINE
Payer: MEDICARE

## 2025-06-04 ENCOUNTER — RESULTS FOLLOW-UP (OUTPATIENT)
Dept: FAMILY MEDICINE | Facility: CLINIC | Age: 70
End: 2025-06-04

## 2025-06-04 DIAGNOSIS — R79.9 ABNORMAL FINDING OF BLOOD CHEMISTRY, UNSPECIFIED: ICD-10-CM

## 2025-06-04 DIAGNOSIS — Z00.00 MEDICARE ANNUAL WELLNESS VISIT, SUBSEQUENT: ICD-10-CM

## 2025-06-04 DIAGNOSIS — N18.32 STAGE 3B CHRONIC KIDNEY DISEASE: ICD-10-CM

## 2025-06-04 DIAGNOSIS — E89.0 POSTOPERATIVE HYPOTHYROIDISM: ICD-10-CM

## 2025-06-04 LAB
25(OH)D3+25(OH)D2 SERPL-MCNC: 54 NG/ML (ref 30–80)
ALBUMIN SERPL-MCNC: 4 G/DL (ref 3.4–4.8)
ALBUMIN/GLOB SERPL: 1.2 RATIO (ref 1.1–2)
ALP SERPL-CCNC: 84 UNIT/L (ref 40–150)
ALT SERPL-CCNC: 19 UNIT/L (ref 0–55)
ANION GAP SERPL CALC-SCNC: 10 MEQ/L
AST SERPL-CCNC: 18 UNIT/L (ref 11–45)
BACTERIA #/AREA URNS AUTO: ABNORMAL /HPF
BASOPHILS # BLD AUTO: 0.06 X10(3)/MCL
BASOPHILS NFR BLD AUTO: 0.8 %
BILIRUB SERPL-MCNC: 0.4 MG/DL
BILIRUB UR QL STRIP.AUTO: NEGATIVE
BUN SERPL-MCNC: 22.2 MG/DL (ref 9.8–20.1)
CALCIUM SERPL-MCNC: 10.4 MG/DL (ref 8.4–10.2)
CAOX CRY UR QL COMP ASSIST: ABNORMAL
CHLORIDE SERPL-SCNC: 103 MMOL/L (ref 98–107)
CHOLEST SERPL-MCNC: 199 MG/DL
CHOLEST/HDLC SERPL: 3 {RATIO} (ref 0–5)
CLARITY UR: CLEAR
CO2 SERPL-SCNC: 28 MMOL/L (ref 23–31)
COLOR UR AUTO: YELLOW
CREAT SERPL-MCNC: 1.25 MG/DL (ref 0.55–1.02)
CREAT UR-MCNC: 195.4 MG/DL (ref 45–106)
CREAT/UREA NIT SERPL: 18
EOSINOPHIL # BLD AUTO: 0.14 X10(3)/MCL (ref 0–0.9)
EOSINOPHIL NFR BLD AUTO: 1.9 %
ERYTHROCYTE [DISTWIDTH] IN BLOOD BY AUTOMATED COUNT: 13.1 % (ref 11.5–17)
EST. AVERAGE GLUCOSE BLD GHB EST-MCNC: 108.3 MG/DL
GFR SERPLBLD CREATININE-BSD FMLA CKD-EPI: 46 ML/MIN/1.73/M2
GLOBULIN SER-MCNC: 3.3 GM/DL (ref 2.4–3.5)
GLUCOSE SERPL-MCNC: 102 MG/DL (ref 82–115)
GLUCOSE UR QL STRIP: NORMAL
HBA1C MFR BLD: 5.4 %
HCT VFR BLD AUTO: 44.3 % (ref 37–47)
HDLC SERPL-MCNC: 70 MG/DL (ref 35–60)
HGB BLD-MCNC: 14 G/DL (ref 12–16)
HGB UR QL STRIP: ABNORMAL
IMM GRANULOCYTES # BLD AUTO: 0.02 X10(3)/MCL (ref 0–0.04)
IMM GRANULOCYTES NFR BLD AUTO: 0.3 %
KETONES UR QL STRIP: NEGATIVE
LDLC SERPL CALC-MCNC: 109 MG/DL (ref 50–140)
LEUKOCYTE ESTERASE UR QL STRIP: NEGATIVE
LYMPHOCYTES # BLD AUTO: 2.25 X10(3)/MCL (ref 0.6–4.6)
LYMPHOCYTES NFR BLD AUTO: 30.6 %
MCH RBC QN AUTO: 30.9 PG (ref 27–31)
MCHC RBC AUTO-ENTMCNC: 31.6 G/DL (ref 33–36)
MCV RBC AUTO: 97.8 FL (ref 80–94)
MICROALBUMIN UR-MCNC: 10.9 UG/ML
MICROALBUMIN/CREAT RATIO PNL UR: 5.6 MG/GM CR (ref 0–30)
MONOCYTES # BLD AUTO: 0.52 X10(3)/MCL (ref 0.1–1.3)
MONOCYTES NFR BLD AUTO: 7.1 %
MUCOUS THREADS URNS QL MICRO: ABNORMAL /LPF
NEUTROPHILS # BLD AUTO: 4.37 X10(3)/MCL (ref 2.1–9.2)
NEUTROPHILS NFR BLD AUTO: 59.3 %
NITRITE UR QL STRIP: NEGATIVE
NRBC BLD AUTO-RTO: 0 %
PH UR STRIP: 6 [PH]
PLATELET # BLD AUTO: 308 X10(3)/MCL (ref 130–400)
PMV BLD AUTO: 9.3 FL (ref 7.4–10.4)
POTASSIUM SERPL-SCNC: 4.3 MMOL/L (ref 3.5–5.1)
PROT SERPL-MCNC: 7.3 GM/DL (ref 5.8–7.6)
PROT UR QL STRIP: NEGATIVE
RBC # BLD AUTO: 4.53 X10(6)/MCL (ref 4.2–5.4)
RBC #/AREA URNS AUTO: ABNORMAL /HPF
SODIUM SERPL-SCNC: 141 MMOL/L (ref 136–145)
SP GR UR STRIP.AUTO: 1.02 (ref 1–1.03)
SQUAMOUS #/AREA URNS LPF: ABNORMAL /HPF
TRIGL SERPL-MCNC: 99 MG/DL (ref 37–140)
TSH SERPL-ACNC: 2.02 UIU/ML (ref 0.35–4.94)
UROBILINOGEN UR STRIP-ACNC: NORMAL
VLDLC SERPL CALC-MCNC: 20 MG/DL
WBC # BLD AUTO: 7.36 X10(3)/MCL (ref 4.5–11.5)
WBC #/AREA URNS AUTO: ABNORMAL /HPF

## 2025-06-04 PROCEDURE — 82306 VITAMIN D 25 HYDROXY: CPT

## 2025-06-04 PROCEDURE — 80061 LIPID PANEL: CPT

## 2025-06-04 PROCEDURE — 85025 COMPLETE CBC W/AUTO DIFF WBC: CPT

## 2025-06-04 PROCEDURE — 83036 HEMOGLOBIN GLYCOSYLATED A1C: CPT

## 2025-06-04 PROCEDURE — 82570 ASSAY OF URINE CREATININE: CPT

## 2025-06-04 PROCEDURE — 36415 COLL VENOUS BLD VENIPUNCTURE: CPT

## 2025-06-04 PROCEDURE — 84443 ASSAY THYROID STIM HORMONE: CPT

## 2025-06-04 PROCEDURE — 80053 COMPREHEN METABOLIC PANEL: CPT

## 2025-06-04 PROCEDURE — 81001 URINALYSIS AUTO W/SCOPE: CPT

## 2025-06-05 ENCOUNTER — TELEPHONE (OUTPATIENT)
Dept: PRIMARY CARE CLINIC | Facility: CLINIC | Age: 70
End: 2025-06-05
Payer: MEDICARE

## 2025-06-09 ENCOUNTER — OFFICE VISIT (OUTPATIENT)
Dept: FAMILY MEDICINE | Facility: CLINIC | Age: 70
End: 2025-06-09
Payer: MEDICARE

## 2025-06-09 VITALS
DIASTOLIC BLOOD PRESSURE: 64 MMHG | WEIGHT: 174 LBS | BODY MASS INDEX: 32.02 KG/M2 | HEART RATE: 85 BPM | RESPIRATION RATE: 12 BRPM | OXYGEN SATURATION: 96 % | TEMPERATURE: 98 F | SYSTOLIC BLOOD PRESSURE: 132 MMHG | HEIGHT: 62 IN

## 2025-06-09 DIAGNOSIS — E78.2 MIXED HYPERLIPIDEMIA: ICD-10-CM

## 2025-06-09 DIAGNOSIS — E83.52 SERUM CALCIUM ELEVATED: ICD-10-CM

## 2025-06-09 DIAGNOSIS — Z00.00 ENCOUNTER FOR MEDICARE ANNUAL WELLNESS EXAM: Primary | ICD-10-CM

## 2025-06-09 DIAGNOSIS — R31.29 MICROHEMATURIA: ICD-10-CM

## 2025-06-09 DIAGNOSIS — M79.604 BILATERAL LEG PAIN: ICD-10-CM

## 2025-06-09 DIAGNOSIS — N18.32 STAGE 3B CHRONIC KIDNEY DISEASE: ICD-10-CM

## 2025-06-09 DIAGNOSIS — M79.605 BILATERAL LEG PAIN: ICD-10-CM

## 2025-06-09 RX ORDER — TRAMADOL HYDROCHLORIDE 50 MG/1
25-50 TABLET, FILM COATED ORAL 2 TIMES DAILY PRN
COMMUNITY
Start: 2025-05-10

## 2025-06-09 NOTE — PROGRESS NOTES
Family Medicine      Patient ID: [id number]     Chief Complaint: Medicare AWV Follow Up (Medicare Wellness w/ labs)      HPI:     Alondra Vigil is a 70 y.o. female here today for a Medicare Annual Wellness visit and comprehensive Health Risk Assessment.     Medicare annual wellness questionnaire responses reviewed, no concerns   Declines vaccines   All other healthcare gaps up-to-date  Return in 1 year for next wellness      A separate E/M code has been provided to evaluate additional complaints that the patient would like addressed during the dedicated Medicare Wellness Exam.    Calcium:  Elevated.  Takes calcium 3 times a day.  She will change that to once daily and we will repeat in three-month     Microhematuria:  6-10 RBCs on microscopy.  She has a urology appointment in July for cystoscopy send the labs to Urology    CKD:  Stable EGFR and creatinine.  Continue to follow up with Nephrology     Cholesterol:  109.  Improved from 01/30 2.  She is taking red yeast rice and she follows Cardiology so they will handle this.      Bilateral leg pain:  Initial complaint was couched as pneumonia vaccine cause me to not be able to walk since 2023.  This was narrowed down to reveal that she can not walk because she has bilateral leg pain since 2023, from the bilateral hips, through the thighs, to the bilateral knees.  Reports it hurts worst at night.  Reports that her thighs are painful and it hurts all the time.  Requested to test blood for metal but I declined this for the time being since there is no indication.  She also believes that the pneumococcal vaccine she received at the time has cause this.  We will start with bilateral leg x-rays and she will return in 2 weeks    Health Maintenance         Date Due Completion Date    TETANUS VACCINE Never done ---    Shingles Vaccine (1 of 2) Never done ---    RSV Vaccine (Age 60+ and Pregnant patients) (1 - Risk 60-74 years 1-dose series) Never done ---    COVID-19  Vaccine (1 - 2024-25 season) Never done ---    Mammogram 12/05/2025 12/5/2024    DEXA Scan 12/28/2025 12/28/2022    Annual UACr 06/04/2026 6/4/2025    Lipid Panel 06/04/2030 6/4/2025    Colorectal Cancer Screening 09/28/2031 9/28/2021             Past Medical History:   Diagnosis Date    Acute ischemic cerebrovascular accident (CVA) involving left middle cerebral artery territory 03/02/2022    Anemia     Anxiety disorder, unspecified     Asthma     Chronic systolic CHF (congestive heart failure)     CKD (chronic kidney disease) stage 3, GFR 30-59 ml/min     Clotting disorder     CVA (cerebral vascular accident)     hospitalized requiring dialysis, trach and PEG, now she has has recovered and her kidney function is also stable    Essential (primary) hypertension     GERD (gastroesophageal reflux disease)     Goiter     HLD (hyperlipidemia)     IBS (irritable bowel syndrome)     Paroxysmal atrial fibrillation     Postsurgical hypothyroidism     Von Willebrand disease         Past Surgical History:   Procedure Laterality Date    APPENDECTOMY      BREAST LUMPECTOMY      CHOLECYSTECTOMY      GASTROSTOMY TUBE PLACEMENT  03/02/2022    HYSTERECTOMY      OOPHORECTOMY Bilateral     PHACOEMULSIFICATION, CATARACT, WITH IOL INSERTION Left 1/16/2025    Procedure: PHACOEMULSIFICATION, CATARACT, WITH IOL INSERTION   /////left eye;  Surgeon: Ezequiel Willingham MD;  Location: [address];  Service: Ophthalmology;  Laterality: Left;    THYROIDECTOMY, PARTIAL      TONSILLECTOMY      TOTAL ABDOMINAL HYSTERECTOMY W/ BILATERAL SALPINGOOPHORECTOMY      TRACHEOSTOMY  03/02/2022        Social History     Socioeconomic History    Marital status:      Spouse name: Dylan    Number of children: 4   Tobacco Use    Smoking status: Former     Average packs/day: 0.5 packs/day for 31.0 years (15.5 ttl pk-yrs)     Types: Cigarettes     Start date: 4/30/1971     Passive exposure: Past    Smokeless tobacco: Never    Tobacco comments:     Pt  quit smoking 25 plus years ago.   Substance and Sexual Activity    Alcohol use: Yes     Comment: socially    Drug use: Never    Sexual activity: Not Currently     Social Drivers of Health     Financial Resource Strain: Low Risk  (11/18/2024)    Overall Financial Resource Strain (CARDIA)     Difficulty of Paying Living Expenses: Not hard at all   Food Insecurity: No Food Insecurity (11/18/2024)    Hunger Vital Sign     Worried About Running Out of Food in the Last Year: Never true     Ran Out of Food in the Last Year: Never true   Transportation Needs: No Transportation Needs (11/18/2024)    TRANSPORTATION NEEDS     Transportation : No   Physical Activity: Inactive (11/18/2024)    Exercise Vital Sign     Days of Exercise per Week: 0 days     Minutes of Exercise per Session: 0 min   Stress: No Stress Concern Present (2/11/2022)    Received from [facility] and [facility]    [facility] of Occupational Health - Occupational Stress Questionnaire     Feeling of Stress : Not at all   Housing Stability: Unknown (11/18/2024)    Housing Stability Vital Sign     Unable to Pay for Housing in the Last Year: No     Homeless in the Last Year: No        Family History   Problem Relation Name Age of Onset    Kidney disease Mother      Kidney disease Father          Current Outpatient Medications   Medication Instructions    albuterol-ipratropium (DUO-NEB) 2.5 mg-0.5 mg/3 mL nebulizer solution 3 mLs, Nebulization, Every 6 hours while awake, Rescue    aminocaproic acid (AMICAR) 500 mg, Once    amLODIPine (NORVASC) 10 mg, Daily    ascorbic acid, vitamin C, (VITAMIN C) 1000 MG tablet 1 tablet, Every morning    budesonide (PULMICORT) 0.5 mg    calcium citrate (CALCITRATE) 200 mg (950 mg) tablet 1 tablet, 3 times daily    ferrous sulfate (IRON ORAL) 1 tablet, Daily    LANOXIN 62.5 mcg (0.0625 mg) Tab 1 tablet, Every other day    montelukast (SINGULAIR) 10 mg, Oral, Daily     "pantoprazole (PROTONIX) 40 mg, 2 times daily    POTASSIUM ORAL 3 times daily    promethazine-codeine 6.25-10 mg/5 ml (PHENERGAN WITH CODEINE) 6.25-10 mg/5 mL syrup promethazine 6.25 mg-codeine 10 mg/5 mL syrup  TAKE 5ML BY MOUTH EVERY 4 TO 6 HOURS AS NEEDED FOR COUGH    spironolactone (ALDACTONE) 25 mg, As needed (PRN)    sucralfate (CARAFATE) 1 g, Daily    thyroid, pork, (ARMOUR THYROID) 30 mg Tab 1 tablet, Daily    traMADoL (ULTRAM) 25-50 mg, 2 times daily PRN    vitamin D (VITAMIN D3) 1,000 Units, Daily    yeast,dried, S. cerevisiae, (MORALEZ'S YEAST ORAL) Daily       Review of patient's allergies indicates:   Allergen Reactions    Lidocaine Swelling    Procaine Other (See Comments)     swelling    Iodine Hives    Penicillins Hives     Tolerating  Zosyn and Augmentin      Sulfa (sulfonamide antibiotics) Hives    Aspirin Other (See Comments)     bleeding  Other reaction(s): Bleeding (finding)  Von Willebrand Disease  Von Willebrand Disease  Von Willebrand Disease      Corticosteroids (glucocorticoids)      "Affects my heart"    Phenylephrine Hives    Phenylephrine hcl Hives    Prevnar 20 (pf) [pneumoc 20-shane conj-dip cr(pf)]     Solifenacin      dizziness    Triamterene Other (See Comments)     unknown    Valacyclovir Other (See Comments)     unknown    Prednisone Palpitations and Other (See Comments)     Other reaction(s): Polyvinyl chloride (substance), Premature atrial contraction (disorder), Tachycardia (finding)          Immunization History   Administered Date(s) Administered    Pneumococcal Conjugate - 20 Valent 01/05/2023        Patient Care Team:  Sheldon Richards MD as Consulting Physician (Cardiology)  Mary Anne Benedict LPN as Care Coordinator  Crow Joshi MD (Endocrinology)  Florentin Dodge MD (Gastroenterology)  Deep Salmeron MD as Consulting Physician (General Surgery)    Subjective:     Review of Systems   Constitutional:  Negative for activity change, appetite change, fever and unexpected " "weight change.   HENT:  Negative for congestion, rhinorrhea and sore throat.    Eyes:  Negative for visual disturbance.   Respiratory:  Negative for cough, chest tightness and shortness of breath.    Cardiovascular:  Negative for chest pain, palpitations and leg swelling.   Gastrointestinal:  Negative for abdominal pain, blood in stool, nausea and vomiting.   Genitourinary:  Negative for difficulty urinating.   Musculoskeletal:  Negative for arthralgias.   Skin:  Negative for rash.   Neurological:  Negative for dizziness, speech difficulty, weakness, light-headedness and numbness.   Psychiatric/Behavioral:  Negative for behavioral problems, confusion, dysphoric mood, self-injury, sleep disturbance and suicidal ideas.        12 point review of systems conducted, negative except as stated in the history of present illness. See HPI for details.    Objective:     Visit Vitals  /64 (BP Location: Right arm, Patient Position: Sitting)   Pulse 85   Temp 98.2 °F (36.8 °C) (Oral)   Resp 12   Ht 5' 2" (1.575 m)   Wt 78.9 kg (174 lb)   LMP  (LMP Unknown)   SpO2 96%   BMI 31.83 kg/m²       Physical Exam  Constitutional:       General: She is not in acute distress.     Appearance: Normal appearance. She is not ill-appearing.   HENT:      Head: Normocephalic and atraumatic.      Right Ear: External ear normal.      Left Ear: External ear normal.      Nose: No congestion.   Eyes:      General:         Right eye: No discharge.         Left eye: No discharge.      Extraocular Movements: Extraocular movements intact.      Conjunctiva/sclera: Conjunctivae normal.   Cardiovascular:      Rate and Rhythm: Normal rate and regular rhythm.   Pulmonary:      Effort: Pulmonary effort is normal. No respiratory distress.      Breath sounds: Normal breath sounds.   Musculoskeletal:      Right lower leg: No edema.      Left lower leg: No edema.   Skin:     Capillary Refill: Capillary refill takes less than 2 seconds.   Neurological:      " Mental Status: She is alert and oriented to person, place, and time. Mental status is at baseline.   Psychiatric:         Mood and Affect: Mood normal.         Behavior: Behavior normal.         Thought Content: Thought content normal.         Judgment: Judgment normal.           Screenings     The following assessments were completed and reviewed. See completed screening forms and assessments within the Encounter Summary.    [x] Health Risk Assessment   [x] CVD Risk Factors   [x] Obesity/Physical Activity -  Encouraged daily 30 minute physical activity x 5 days per week.   [x] Home Safety/Living Situation   [x] Alcohol Screen  [x] Depression (PHQ) Screen   [x] Timed Get Up and Go   [x] Whisper Test  [x] Cognitive Function/Impairment Screen   [x] Nutrition Screening  [x] ADL Screen   [x] Opioid Screen:  [x] Patient does not have a prescription for opioids.   [] Patient has a prescription for opioids but is at low risk for abuse.   [x] Substance Abuse Screen:   [x] Patient does not use substances.   [] Patient screens positive for substance use disorder.     Advanced Care Planning     Advance Care Planning   Advance Care Planning     Date: 06/09/2025    Initial complaint was couched as pneumonia vaccine cause me to not be able to walk since 2023.  This was narrowed down to reveal that she can not walk because she has bilateral leg pain since 2023, from the bilateral hips, through the thighs, to the bilateral knees.  Reports it hurts worst at night.  Reports that her thighs are painful and it hurts all the time.  Requested to test blood for metal but I declined this for the time being since there is no indication.  She also believes that the pneumococcal vaccine she received at the time has cause this.  We will start with bilateral leg x-rays and she will return in 2 weeks         I attest to discussing Advance Care Planning with patient and/or family member.  Education was provided including the importance of the  Health Care Power of , Advance Directives, and/or [place] documentation.  The patient expressed understanding to the importance of this information and discussion.       Assessment:       ICD-10-CM ICD-9-CM   1. Encounter for Medicare annual wellness exam  Z00.00 V70.0   2. Stage 3b chronic kidney disease  N18.32 585.3   3. Mixed hyperlipidemia  E78.2 272.2   4. Bilateral leg pain  M79.604 729.5    M79.605    5. Serum calcium elevated  E83.52 275.42   6. Microhematuria  R31.29 599.72   7. BMI 31.0-31.9,adult  Z68.31 V85.31        Plan:     1. Encounter for Medicare annual wellness exam  -     CBC Auto Differential; Future; Expected date: 06/09/2026  -     Comprehensive Metabolic Panel; Future; Expected date: 06/09/2026  -     Lipid Panel; Future; Expected date: 06/09/2026  -     TSH; Future; Expected date: 06/09/2026  -     Hemoglobin A1C; Future; Expected date: 06/09/2026  -     Urinalysis; Future; Expected date: 06/09/2026  -     Vitamin D; Future; Expected date: 06/09/2026    2. Stage 3b chronic kidney disease  Overview:  Sees nephrology, Dr. Majano. No longer on dialysis. CKD is stable.     Continue current Rx meds  Will continue to monitor renal indices  Avoid NSAIDS (aleve, motrin, naproxen, mobic, ibuprofen). It is ok to take in small doses on occasion, but check with your doctor first  Increase water intake to 80 oz of water daily  Adhere to a low protein diet        Orders:  -     Comprehensive Metabolic Panel; Future; Expected date: 09/09/2025    3. Mixed hyperlipidemia  Overview:  109. Declines statin.  Improved from one hundred thirty-two..  She is taking red yeast rice and she follows Cardiology so they will handle this.              4. Bilateral leg pain  Overview:  Initial complaint was couched as pneumonia vaccine cause me to not be able to walk since 2023.  This was narrowed down to reveal that she can not walk because she has bilateral leg pain since 2023, from the bilateral hips,  through the thighs, to the bilateral knees.  Reports it hurts worst at night.  Reports that her thighs are painful and it hurts all the time.  Requested to test blood for metal but I declined this for the time being since there is no indication.  She also believes that the pneumococcal vaccine she received at the time has cause this.  We will start with bilateral leg x-rays and she will return in 2 weeks    Orders:  -     X-Ray Hips Bilateral 2 View Incl AP Pelvis; Future; Expected date: 06/10/2025  -     X-Ray Knee AP Standing Bilateral; Future; Expected date: 06/10/2025    5. Serum calcium elevated  Overview:  Elevated.  Takes calcium 3 times a day.  She will change that to once daily and we will repeat in three-month     Orders:  -     Comprehensive Metabolic Panel; Future; Expected date: 09/09/2025    6. Microhematuria  Overview:  6-10 RBCs on microscopy.  She has a urology appointment in July for cystoscopy send the labs to Urology      7. BMI 31.0-31.9,adult  -     Vitamin D; Future; Expected date: 06/09/2026             Provided patient with a 5-10 year written screening schedule and personal prevention plan. Recommendations were developed using the USPSTF age appropriate recommendations. Education, counseling, and referrals were provided as needed. After Visit Summary printed and given to patient, which includes a list of additional screenings\tests needed.    No follow-ups on file. In addition to their scheduled follow up, the patient has also been instructed to follow up on as needed basis.     Future Appointments   Date Time Provider Department Center   7/2/2025  1:00 PM Tracy Juarez MD [facility]   6/15/2026  1:40 PM Low Chu MD [facility]        Low Chu MD

## 2025-06-25 ENCOUNTER — RESULTS FOLLOW-UP (OUTPATIENT)
Dept: FAMILY MEDICINE | Facility: CLINIC | Age: 70
End: 2025-06-25
Payer: MEDICARE

## 2025-07-02 ENCOUNTER — OFFICE VISIT (OUTPATIENT)
Dept: FAMILY MEDICINE | Facility: CLINIC | Age: 70
End: 2025-07-02
Payer: MEDICARE

## 2025-07-02 VITALS
SYSTOLIC BLOOD PRESSURE: 122 MMHG | TEMPERATURE: 98 F | BODY MASS INDEX: 31.34 KG/M2 | RESPIRATION RATE: 12 BRPM | HEART RATE: 72 BPM | HEIGHT: 62 IN | WEIGHT: 170.31 LBS | DIASTOLIC BLOOD PRESSURE: 74 MMHG | OXYGEN SATURATION: 97 %

## 2025-07-02 DIAGNOSIS — M17.0 PRIMARY OSTEOARTHRITIS OF BOTH KNEES: ICD-10-CM

## 2025-07-02 DIAGNOSIS — M16.0 PRIMARY OSTEOARTHRITIS OF BOTH HIPS: ICD-10-CM

## 2025-07-02 DIAGNOSIS — M85.89 OSTEOPENIA OF MULTIPLE SITES: Primary | ICD-10-CM

## 2025-07-02 PROCEDURE — 99214 OFFICE O/P EST MOD 30 MIN: CPT | Mod: ,,,

## 2025-07-02 RX ORDER — SOLIFENACIN SUCCINATE 5 MG/1
5 TABLET, FILM COATED ORAL DAILY
COMMUNITY
Start: 2025-06-23

## 2025-07-02 RX ORDER — IBUPROFEN 200 MG
1 CAPSULE ORAL DAILY
COMMUNITY

## 2025-07-02 NOTE — PROGRESS NOTES
[facility]  Tracy Juarez MD    Patient ID: [id number]     Chief Complaint: Follow-up (3 wk f/u)      HPI:     Alondra Vigil is a 70 y.o. female   With hyperlipidemia, stage IIIB CKD here today for  a follow up of  bilateral leg pain.     X-rays showed severe degenerative arthritis in both hips, advanced lumbar spine degenerative disc disease, and moderate to severe bilateral knee arthritis.    Patient has been referred to ortho in the past for her shoulder. She sees Dr. Torres for her back.    Imaging also showed diffuse osteopenia.  DEXA  scan in 2022 showed osteopenia throughout with a FRAX score of 8.2% for major osteoporotic fracture and 0.6% for hip fracture.        Past Medical History:   Diagnosis Date    Acute ischemic cerebrovascular accident (CVA) involving left middle cerebral artery territory 03/02/2022    Anemia     Anxiety disorder, unspecified     Asthma     Chronic systolic CHF (congestive heart failure)     CKD (chronic kidney disease) stage 3, GFR 30-59 ml/min     Clotting disorder     CVA (cerebral vascular accident)     hospitalized requiring dialysis, trach and PEG, now she has has recovered and her kidney function is also stable    Essential (primary) hypertension     GERD (gastroesophageal reflux disease)     Goiter     HLD (hyperlipidemia)     IBS (irritable bowel syndrome)     Paroxysmal atrial fibrillation     Postsurgical hypothyroidism     Von Willebrand disease         Past Surgical History:   Procedure Laterality Date    APPENDECTOMY      BREAST LUMPECTOMY      CHOLECYSTECTOMY      GASTROSTOMY TUBE PLACEMENT  03/02/2022    HYSTERECTOMY      OOPHORECTOMY Bilateral     PHACOEMULSIFICATION, CATARACT, WITH IOL INSERTION Left 1/16/2025    Procedure: PHACOEMULSIFICATION, CATARACT, WITH IOL INSERTION   /////left eye;  Surgeon: Ezequiel Willingham MD;  Location: [address];  Service: Ophthalmology;  Laterality: Left;    THYROIDECTOMY, PARTIAL      TONSILLECTOMY       TOTAL ABDOMINAL HYSTERECTOMY W/ BILATERAL SALPINGOOPHORECTOMY      TRACHEOSTOMY  03/02/2022        Social History     Tobacco Use    Smoking status: Former     Average packs/day: 0.5 packs/day for 31.0 years (15.5 ttl pk-yrs)     Types: Cigarettes     Start date: 4/30/1971     Passive exposure: Past    Smokeless tobacco: Never    Tobacco comments:     Pt quit smoking 25 plus years ago.   Substance and Sexual Activity    Alcohol use: Yes     Comment: socially    Drug use: Never    Sexual activity: Not Currently        Current Outpatient Medications   Medication Instructions    albuterol-ipratropium (DUO-NEB) 2.5 mg-0.5 mg/3 mL nebulizer solution 3 mLs, Nebulization, Every 6 hours while awake, Rescue    aminocaproic acid (AMICAR) 500 mg, Once    amLODIPine (NORVASC) 10 mg, Daily    ascorbic acid, vitamin C, (VITAMIN C) 1000 MG tablet 1 tablet, Every morning    budesonide (PULMICORT) 0.5 mg    calcium citrate (CALCITRATE) 200 mg (950 mg) tablet 1 tablet, 3 times daily    calcium citrate (CALCITRATE) 200 mg (950 mg) tablet 1 tablet, Daily    ferrous sulfate (IRON ORAL) 1 tablet, Daily    LANOXIN 62.5 mcg (0.0625 mg) Tab 1 tablet, Every other day    montelukast (SINGULAIR) 10 mg, Oral, Daily    pantoprazole (PROTONIX) 40 mg, 2 times daily    POTASSIUM ORAL 3 times daily    promethazine-codeine 6.25-10 mg/5 ml (PHENERGAN WITH CODEINE) 6.25-10 mg/5 mL syrup promethazine 6.25 mg-codeine 10 mg/5 mL syrup  TAKE 5ML BY MOUTH EVERY 4 TO 6 HOURS AS NEEDED FOR COUGH    RED YEAST RICE ORAL 1,200 mg, 2 times daily    solifenacin (VESICARE) 5 mg, Daily    spironolactone (ALDACTONE) 25 mg, As needed (PRN)    sucralfate (CARAFATE) 1 g, Daily    thyroid, pork, (ARMOUR THYROID) 30 mg Tab 1 tablet, Daily    traMADoL (ULTRAM) 25-50 mg, 2 times daily PRN    vitamin D (VITAMIN D3) 1,000 Units, Daily    yeast,dried, S. cerevisiae, (MORALEZ'S YEAST ORAL) Daily       Review of patient's allergies indicates:   Allergen Reactions    Lidocaine  "Swelling    Procaine Other (See Comments)     swelling    Iodine Hives    Penicillins Hives     Tolerating  Zosyn and Augmentin      Sulfa (sulfonamide antibiotics) Hives    Aspirin Other (See Comments)     bleeding  Other reaction(s): Bleeding (finding)  Von Willebrand Disease  Von Willebrand Disease  Von Willebrand Disease      Corticosteroids (glucocorticoids)      "Affects my heart"    Phenylephrine Hives    Phenylephrine hcl Hives    Prevnar 20 (pf) [pneumoc 20-shane conj-dip cr(pf)]     Solifenacin      dizziness    Triamterene Other (See Comments)     unknown    Valacyclovir Other (See Comments)     unknown    Prednisone Palpitations and Other (See Comments)     Other reaction(s): Polyvinyl chloride (substance), Premature atrial contraction (disorder), Tachycardia (finding)          Patient Care Team:  Sheldon Richards MD as Consulting Physician (Cardiology)  Mary Anne Benedict LPN as Care Coordinator  Crow Joshi MD (Endocrinology)  Florentin Dodge MD (Gastroenterology)  Deep Salmeron MD as Consulting Physician (General Surgery)     Subjective:     Review of Systems    12 point review of systems conducted, negative except as stated in the history of present illness. See HPI for details.    Objective:     Visit Vitals  /74 (BP Location: Right arm, Patient Position: Sitting)   Pulse 72   Temp 98 °F (36.7 °C) (Oral)   Resp 12   Ht 5' 2" (1.575 m)   Wt 77.2 kg (170 lb 4.8 oz)   LMP  (LMP Unknown)   SpO2 97%   BMI 31.15 kg/m²       Physical Exam  Vitals and nursing note reviewed.   Constitutional:       General: She is not in acute distress.     Appearance: She is not ill-appearing.   HENT:      Head: Normocephalic and atraumatic.   Eyes:      General: No scleral icterus.     Extraocular Movements: Extraocular movements intact.      Conjunctiva/sclera: Conjunctivae normal.   Cardiovascular:      Rate and Rhythm: Normal rate.   Pulmonary:      Effort: Pulmonary effort is normal. No respiratory " distress.   Musculoskeletal:         General: Normal range of motion.      Comments: Slow ambulation, leaning forward on walker.    Skin:     General: Skin is warm and dry.   Neurological:      General: No focal deficit present.      Mental Status: She is alert.   Psychiatric:         Mood and Affect: Mood normal.         Labs Reviewed:     Chemistry:  Lab Results   Component Value Date     06/04/2025    K 4.3 06/04/2025    CHLORIDE 100.0 01/10/2022    BUN 22.2 (H) 06/04/2025    CREATININE 1.25 (H) 06/04/2025    EGFRNORACEVR 46 06/04/2025    GLUCOSE 86 01/10/2022    CALCIUM 10.4 (H) 06/04/2025    ALKPHOS 84 06/04/2025    LABPROT 17.6 (H) 03/08/2022    ALBUMIN 4.0 06/04/2025    BILIDIR 0.2 05/05/2022    IBILI 0.20 05/05/2022    AST 18 06/04/2025    ALT 19 06/04/2025    MG 2.10 12/06/2023    PHOS 3.1 06/03/2022    CLTABFIP39NA 54 06/04/2025    TSH 2.016 06/04/2025        Lab Results   Component Value Date    HGBA1C 5.4 06/04/2025        Hematology:  Lab Results   Component Value Date    WBC 7.36 06/04/2025    HGB 14.0 06/04/2025    HCT 44.3 06/04/2025     06/04/2025       Lipid Panel:  Lab Results   Component Value Date    CHOL 199 06/04/2025    HDL 70 (H) 06/04/2025    .00 06/04/2025    TRIG 99 06/04/2025    TOTALCHOLEST 3 06/04/2025        Urine:  Lab Results   Component Value Date    APPEARANCEUA Clear 06/04/2025    SGUA 1.023 06/04/2025    PROTEINUA Negative 06/04/2025    KETONESUA Negative 06/04/2025    LEUKOCYTESUR Negative 06/04/2025    RBCUA 6-10 (A) 06/04/2025    WBCUA 0-5 06/04/2025    BACTERIA None Seen 06/04/2025    SQEPUA None Seen 06/04/2025    CREATRANDUR 195.4 (H) 06/04/2025    PROTEINURINE <6.8 01/03/2025        Assessment:       ICD-10-CM ICD-9-CM   1. Osteopenia of multiple sites  M85.89 733.90   2. Primary osteoarthritis of both hips  M16.0 715.15   3. Primary osteoarthritis of both knees  M17.0 715.16        Plan:     1. Osteopenia of multiple sites  Assessment & Plan:  DEXA   scan in 2022 showed osteopenia throughout with a FRAX score of 8.2% for major osteoporotic fracture and 0.6% for hip fracture.    She is due for repeat DEXA. Ordered today    Orders:  -     DXA Bone Density Axial Skeleton 1 or more sites; Future; Expected date: 07/02/2025    2. Primary osteoarthritis of both hips  Assessment & Plan:  Patient with severe OA in both hips and knees. Will refer to Dr. Barton to discuss injections and surgery. Will also start PT as this has helped her in the past. On Tramadol for pain.      Orders:  -     Ambulatory referral/consult to Orthopedics; Future; Expected date: 07/09/2025  -     Ambulatory Referral/Consult to Physical Therapy  -     HME - OTHER    3. Primary osteoarthritis of both knees  -     Ambulatory referral/consult to Orthopedics; Future; Expected date: 07/09/2025  -     Ambulatory Referral/Consult to Physical Therapy  -     HME - OTHER        Natis mobility limitation cannot be sufficiently resolved by the use of a cane. Her functional mobility deficit can be sufficiently resolved with the use of a Rollator. Patient's mobility limitation significantly impairs their ability to participate in one of more activities of daily living.  The use of a Rollator will significantly improve the patient's ability to participate in MRADLS and the patient will use it on regular basis in the home.        No follow-ups on file. In addition to their scheduled follow up, the patient has also been instructed to follow up on as needed basis.     Future Appointments   Date Time Provider Department Center   6/15/2026  1:40 PM Low Chu MD [facility] Juarez MD

## 2025-07-02 NOTE — ASSESSMENT & PLAN NOTE
Patient has multiple people in the home that have tested positive for Covid. Patient has migraines, chills, and nausea. Symptoms started 2 days ago.      MD verified   Medications verified  Denies latex allergy    Cell [phone] Ok to leave a message Patient with severe OA in both hips and knees. Will refer to Dr. Barton to discuss injections and surgery. Will also start PT as this has helped her in the past. On Tramadol for pain.

## 2025-07-02 NOTE — ASSESSMENT & PLAN NOTE
DEXA  scan in 2022 showed osteopenia throughout with a FRAX score of 8.2% for major osteoporotic fracture and 0.6% for hip fracture.    She is due for repeat DEXA. Ordered today

## 2025-07-03 ENCOUNTER — TELEPHONE (OUTPATIENT)
Dept: FAMILY MEDICINE | Facility: CLINIC | Age: 70
End: 2025-07-03
Payer: MEDICARE

## 2025-07-03 NOTE — TELEPHONE ENCOUNTER
Copied from CRM #[id number]. Topic: General Inquiry - Patient Advice  >> Jul 3, 2025 11:11 AM Erasmo wrote:  Who Called: Alondra Vigil    Caller is requesting assistance/information from provider's office.    Symptoms (please be specific):    How long has patient had these symptoms:    List of preferred pharmacies on file (remove unneeded): [unfilled]  If different, enter pharmacy into here including location and phone number:       Preferred Method of Contact: Phone Call  Patient's Preferred Phone Number on File: [phone]   Best Call Back Number, if different:  Additional Information: PT is requesting update on walker prescription written by ms mohr. Please advise.

## 2025-07-21 ENCOUNTER — TELEPHONE (OUTPATIENT)
Dept: FAMILY MEDICINE | Facility: CLINIC | Age: 70
End: 2025-07-21
Payer: MEDICARE

## 2025-07-21 NOTE — TELEPHONE ENCOUNTER
I spoke with patient and sent the order with notes to [facility] for her walker per her request. Alexis

## 2025-07-21 NOTE — TELEPHONE ENCOUNTER
----- Message from Italia sent at 7/21/2025  1:57 PM CDT -----  Regarding: Med Advice  .Who Called: Alondra Vigil    Caller is requesting assistance/information from provider's office.    Symptoms (please be specific):    How long has patient had these symptoms:    List of preferred pharmacies on file (remove unneeded): [unfilled]  If different, enter pharmacy into here including location and phone number:       Preferred Method of Contact: Phone Call  Patient's Preferred Phone Number on File: [phone]   Best Call Back Number, if different:  Additional Information: Pt states she needs a order for a walker in order for Medicare to cover it. Pt states it could be mailed or she can pick it up when ready.. db

## (undated) DEVICE — PACK EYE SWAN DISPOSABLE

## (undated) DEVICE — ADAPTER DUAL NSL LUER M-M 7FT

## (undated) DEVICE — TIP INTREPID I/A STR .03MM

## (undated) DEVICE — PACK CASSETTE TUBE ELLIPS FX

## (undated) DEVICE — SYR 3ML LL 18GA 1.5IN

## (undated) DEVICE — Device

## (undated) DEVICE — GOWN POLY REINF BRTH SLV XL

## (undated) DEVICE — CARTRIDGE SHOOTER

## (undated) DEVICE — TIP PHACO 30 DEG BEVEL 20GA

## (undated) DEVICE — SYR W NDL BLN FILL 5ML 18GX1.5

## (undated) DEVICE — GLOVE SENSICARE PI MICRO 7